# Patient Record
Sex: MALE | Race: ASIAN | NOT HISPANIC OR LATINO
[De-identification: names, ages, dates, MRNs, and addresses within clinical notes are randomized per-mention and may not be internally consistent; named-entity substitution may affect disease eponyms.]

---

## 2017-01-20 ENCOUNTER — APPOINTMENT (OUTPATIENT)
Dept: INTERNAL MEDICINE | Facility: CLINIC | Age: 27
End: 2017-01-20

## 2017-04-13 ENCOUNTER — APPOINTMENT (OUTPATIENT)
Dept: INTERNAL MEDICINE | Facility: CLINIC | Age: 27
End: 2017-04-13

## 2017-04-13 VITALS
WEIGHT: 192 LBS | HEIGHT: 70 IN | SYSTOLIC BLOOD PRESSURE: 104 MMHG | BODY MASS INDEX: 27.49 KG/M2 | HEART RATE: 68 BPM | DIASTOLIC BLOOD PRESSURE: 64 MMHG | OXYGEN SATURATION: 99 %

## 2017-04-13 DIAGNOSIS — Z23 ENCOUNTER FOR IMMUNIZATION: ICD-10-CM

## 2017-04-18 LAB
ALBUMIN SERPL ELPH-MCNC: 4.1 G/DL
ALP BLD-CCNC: 65 U/L
ALT SERPL-CCNC: 48 U/L
ANION GAP SERPL CALC-SCNC: 13 MMOL/L
AST SERPL-CCNC: 33 U/L
BASOPHILS # BLD AUTO: 0.02 K/UL
BASOPHILS NFR BLD AUTO: 0.3 %
BILIRUB SERPL-MCNC: 0.7 MG/DL
BUN SERPL-MCNC: 16 MG/DL
CALCIUM SERPL-MCNC: 9.6 MG/DL
CHLORIDE SERPL-SCNC: 103 MMOL/L
CHOLEST SERPL-MCNC: 154 MG/DL
CHOLEST/HDLC SERPL: 2.5 RATIO
CO2 SERPL-SCNC: 23 MMOL/L
CREAT SERPL-MCNC: 1 MG/DL
EOSINOPHIL # BLD AUTO: 0.11 K/UL
EOSINOPHIL NFR BLD AUTO: 1.9 %
FOLATE SERPL-MCNC: 8.5 NG/ML
GLUCOSE SERPL-MCNC: 99 MG/DL
HBA1C MFR BLD HPLC: 5.5 %
HBV CORE IGG+IGM SER QL: NONREACTIVE
HBV SURFACE AB SER QL: REACTIVE
HBV SURFACE AG SER QL: NONREACTIVE
HCT VFR BLD CALC: 48.5 %
HCV AB SER QL: NONREACTIVE
HCV S/CO RATIO: 0.12 S/CO
HDLC SERPL-MCNC: 62 MG/DL
HGB BLD-MCNC: 16 G/DL
HIV1+2 AB SPEC QL IA.RAPID: NONREACTIVE
IMM GRANULOCYTES NFR BLD AUTO: 0.2 %
IRON SATN MFR SERPL: 33 %
IRON SERPL-MCNC: 125 UG/DL
LDLC SERPL CALC-MCNC: 78 MG/DL
LYMPHOCYTES # BLD AUTO: 1.86 K/UL
LYMPHOCYTES NFR BLD AUTO: 32.1 %
MAN DIFF?: NORMAL
MCHC RBC-ENTMCNC: 26.2 PG
MCHC RBC-ENTMCNC: 33 GM/DL
MCV RBC AUTO: 79.4 FL
MONOCYTES # BLD AUTO: 0.42 K/UL
MONOCYTES NFR BLD AUTO: 7.3 %
NEUTROPHILS # BLD AUTO: 3.37 K/UL
NEUTROPHILS NFR BLD AUTO: 58.2 %
PLATELET # BLD AUTO: 223 K/UL
POTASSIUM SERPL-SCNC: 4.5 MMOL/L
PROT SERPL-MCNC: 7.9 G/DL
RBC # BLD: 6.11 M/UL
RBC # FLD: 14 %
SODIUM SERPL-SCNC: 139 MMOL/L
TIBC SERPL-MCNC: 382 UG/DL
TRIGL SERPL-MCNC: 69 MG/DL
TSH SERPL-ACNC: 1.1 UIU/ML
UIBC SERPL-MCNC: 257 UG/DL
VIT B12 SERPL-MCNC: 336 PG/ML
WBC # FLD AUTO: 5.79 K/UL

## 2017-04-19 LAB — HEV AB SER QL: NEGATIVE

## 2017-04-20 ENCOUNTER — APPOINTMENT (OUTPATIENT)
Dept: SURGERY | Facility: CLINIC | Age: 27
End: 2017-04-20

## 2017-04-20 VITALS
DIASTOLIC BLOOD PRESSURE: 74 MMHG | SYSTOLIC BLOOD PRESSURE: 118 MMHG | HEART RATE: 54 BPM | RESPIRATION RATE: 15 BRPM | OXYGEN SATURATION: 98 % | HEIGHT: 72 IN | TEMPERATURE: 98.3 F | BODY MASS INDEX: 26.01 KG/M2 | WEIGHT: 192 LBS

## 2017-04-28 ENCOUNTER — OTHER (OUTPATIENT)
Age: 27
End: 2017-04-28

## 2017-04-28 ENCOUNTER — APPOINTMENT (OUTPATIENT)
Dept: SURGERY | Facility: CLINIC | Age: 27
End: 2017-04-28

## 2017-04-28 VITALS
DIASTOLIC BLOOD PRESSURE: 81 MMHG | OXYGEN SATURATION: 97 % | SYSTOLIC BLOOD PRESSURE: 115 MMHG | TEMPERATURE: 98 F | HEART RATE: 78 BPM | RESPIRATION RATE: 15 BRPM

## 2017-05-03 ENCOUNTER — OTHER (OUTPATIENT)
Age: 27
End: 2017-05-03

## 2017-05-15 ENCOUNTER — APPOINTMENT (OUTPATIENT)
Dept: SURGERY | Facility: CLINIC | Age: 27
End: 2017-05-15

## 2017-05-16 ENCOUNTER — OUTPATIENT (OUTPATIENT)
Dept: OUTPATIENT SERVICES | Facility: HOSPITAL | Age: 27
LOS: 1 days | End: 2017-05-16
Payer: COMMERCIAL

## 2017-05-16 VITALS
DIASTOLIC BLOOD PRESSURE: 82 MMHG | HEIGHT: 72 IN | RESPIRATION RATE: 18 BRPM | OXYGEN SATURATION: 98 % | WEIGHT: 183.2 LBS | SYSTOLIC BLOOD PRESSURE: 121 MMHG | TEMPERATURE: 98 F

## 2017-05-16 DIAGNOSIS — Z90.49 ACQUIRED ABSENCE OF OTHER SPECIFIED PARTS OF DIGESTIVE TRACT: Chronic | ICD-10-CM

## 2017-05-16 DIAGNOSIS — Z01.818 ENCOUNTER FOR OTHER PREPROCEDURAL EXAMINATION: ICD-10-CM

## 2017-05-16 DIAGNOSIS — R10.9 UNSPECIFIED ABDOMINAL PAIN: ICD-10-CM

## 2017-05-16 DIAGNOSIS — R10.31 RIGHT LOWER QUADRANT PAIN: ICD-10-CM

## 2017-05-16 LAB
HCT VFR BLD CALC: 45.4 % — SIGNIFICANT CHANGE UP (ref 39–50)
HGB BLD-MCNC: 15.7 G/DL — SIGNIFICANT CHANGE UP (ref 13–17)
MCHC RBC-ENTMCNC: 27.1 PG — SIGNIFICANT CHANGE UP (ref 27–34)
MCHC RBC-ENTMCNC: 34.6 GM/DL — SIGNIFICANT CHANGE UP (ref 32–36)
MCV RBC AUTO: 78.3 FL — LOW (ref 80–100)
PLATELET # BLD AUTO: 225 K/UL — SIGNIFICANT CHANGE UP (ref 150–400)
RBC # BLD: 5.8 M/UL — SIGNIFICANT CHANGE UP (ref 4.2–5.8)
RBC # FLD: 13.5 % — SIGNIFICANT CHANGE UP (ref 10.3–14.5)
WBC # BLD: 6.77 K/UL — SIGNIFICANT CHANGE UP (ref 3.8–10.5)
WBC # FLD AUTO: 6.77 K/UL — SIGNIFICANT CHANGE UP (ref 3.8–10.5)

## 2017-05-16 PROCEDURE — G0463: CPT

## 2017-05-16 PROCEDURE — 85027 COMPLETE CBC AUTOMATED: CPT

## 2017-05-16 RX ORDER — SODIUM CHLORIDE 9 MG/ML
3 INJECTION INTRAMUSCULAR; INTRAVENOUS; SUBCUTANEOUS EVERY 8 HOURS
Qty: 0 | Refills: 0 | Status: DISCONTINUED | OUTPATIENT
Start: 2017-05-24 | End: 2017-05-24

## 2017-05-16 RX ORDER — CEFAZOLIN SODIUM 1 G
2000 VIAL (EA) INJECTION ONCE
Qty: 0 | Refills: 0 | Status: COMPLETED | OUTPATIENT
Start: 2017-05-24 | End: 2017-05-24

## 2017-05-16 NOTE — H&P PST ADULT - PMH
Anal fissure    Chest pain  cardiac work up diagnosed GERD 2013  GERD (gastroesophageal reflux disease)  resolved, on no med  Pneumonia  2016

## 2017-05-16 NOTE — H&P PST ADULT - HISTORY OF PRESENT ILLNESS
This is a 27 y/o male c/o RLQ abdominal pain This is a 27 y/o male c/o RLQ abdominal pain s/p appendectomy in [place] 2004, he presents today for surgery

## 2017-05-16 NOTE — H&P PST ADULT - NSANTHOSAYNRD_GEN_A_CORE
No. YO screening performed.  STOP BANG Legend: 0-2 = LOW Risk; 3-4 = INTERMEDIATE Risk; 5-8 = HIGH Risk

## 2017-05-24 ENCOUNTER — OUTPATIENT (OUTPATIENT)
Dept: INPATIENT UNIT | Facility: HOSPITAL | Age: 27
LOS: 1 days | Discharge: HOME | End: 2017-05-24
Payer: COMMERCIAL

## 2017-05-24 ENCOUNTER — APPOINTMENT (OUTPATIENT)
Dept: SURGERY | Facility: HOSPITAL | Age: 27
End: 2017-05-24

## 2017-05-24 ENCOUNTER — TRANSCRIPTION ENCOUNTER (OUTPATIENT)
Age: 27
End: 2017-05-24

## 2017-05-24 VITALS
SYSTOLIC BLOOD PRESSURE: 125 MMHG | DIASTOLIC BLOOD PRESSURE: 73 MMHG | HEIGHT: 72 IN | OXYGEN SATURATION: 100 % | HEART RATE: 75 BPM | TEMPERATURE: 98 F | WEIGHT: 183.2 LBS | RESPIRATION RATE: 18 BRPM

## 2017-05-24 DIAGNOSIS — R10.31 RIGHT LOWER QUADRANT PAIN: ICD-10-CM

## 2017-05-24 DIAGNOSIS — Z90.49 ACQUIRED ABSENCE OF OTHER SPECIFIED PARTS OF DIGESTIVE TRACT: Chronic | ICD-10-CM

## 2017-05-24 LAB
ANION GAP SERPL CALC-SCNC: 12 MMOL/L — SIGNIFICANT CHANGE UP (ref 5–17)
BUN SERPL-MCNC: 15 MG/DL — SIGNIFICANT CHANGE UP (ref 7–23)
CALCIUM SERPL-MCNC: 8.2 MG/DL — LOW (ref 8.4–10.5)
CHLORIDE SERPL-SCNC: 104 MMOL/L — SIGNIFICANT CHANGE UP (ref 96–108)
CO2 SERPL-SCNC: 25 MMOL/L — SIGNIFICANT CHANGE UP (ref 22–31)
CREAT SERPL-MCNC: 1.07 MG/DL — SIGNIFICANT CHANGE UP (ref 0.5–1.3)
GLUCOSE SERPL-MCNC: 99 MG/DL — SIGNIFICANT CHANGE UP (ref 70–99)
HCT VFR BLD CALC: 41.8 % — SIGNIFICANT CHANGE UP (ref 39–50)
HGB BLD-MCNC: 14.3 G/DL — SIGNIFICANT CHANGE UP (ref 13–17)
MAGNESIUM SERPL-MCNC: 2 MG/DL — SIGNIFICANT CHANGE UP (ref 1.6–2.6)
MCHC RBC-ENTMCNC: 28.3 PG — SIGNIFICANT CHANGE UP (ref 27–34)
MCHC RBC-ENTMCNC: 34.3 GM/DL — SIGNIFICANT CHANGE UP (ref 32–36)
MCV RBC AUTO: 82.6 FL — SIGNIFICANT CHANGE UP (ref 80–100)
PHOSPHATE SERPL-MCNC: 3.2 MG/DL — SIGNIFICANT CHANGE UP (ref 2.5–4.5)
PLATELET # BLD AUTO: 188 K/UL — SIGNIFICANT CHANGE UP (ref 150–400)
POTASSIUM SERPL-MCNC: 4.2 MMOL/L — SIGNIFICANT CHANGE UP (ref 3.5–5.3)
POTASSIUM SERPL-SCNC: 4.2 MMOL/L — SIGNIFICANT CHANGE UP (ref 3.5–5.3)
RBC # BLD: 5.06 M/UL — SIGNIFICANT CHANGE UP (ref 4.2–5.8)
RBC # FLD: 12.2 % — SIGNIFICANT CHANGE UP (ref 10.3–14.5)
SODIUM SERPL-SCNC: 141 MMOL/L — SIGNIFICANT CHANGE UP (ref 135–145)
WBC # BLD: 10.3 K/UL — SIGNIFICANT CHANGE UP (ref 3.8–10.5)
WBC # FLD AUTO: 10.3 K/UL — SIGNIFICANT CHANGE UP (ref 3.8–10.5)

## 2017-05-24 RX ORDER — SODIUM CHLORIDE 9 MG/ML
1000 INJECTION, SOLUTION INTRAVENOUS
Qty: 0 | Refills: 0 | Status: DISCONTINUED | OUTPATIENT
Start: 2017-05-24 | End: 2017-05-24

## 2017-05-24 RX ORDER — ACETAMINOPHEN 500 MG
1000 TABLET ORAL ONCE
Qty: 0 | Refills: 0 | Status: COMPLETED | OUTPATIENT
Start: 2017-05-24 | End: 2017-05-24

## 2017-05-24 RX ORDER — LIDOCAINE 4 G/100G
1 CREAM TOPICAL EVERY 8 HOURS
Qty: 0 | Refills: 0 | Status: DISCONTINUED | OUTPATIENT
Start: 2017-05-24 | End: 2017-05-25

## 2017-05-24 RX ORDER — ONDANSETRON 8 MG/1
4 TABLET, FILM COATED ORAL EVERY 8 HOURS
Qty: 0 | Refills: 0 | Status: DISCONTINUED | OUTPATIENT
Start: 2017-05-24 | End: 2017-05-24

## 2017-05-24 RX ORDER — HEPARIN SODIUM 5000 [USP'U]/ML
5000 INJECTION INTRAVENOUS; SUBCUTANEOUS EVERY 8 HOURS
Qty: 0 | Refills: 0 | Status: DISCONTINUED | OUTPATIENT
Start: 2017-05-24 | End: 2017-05-25

## 2017-05-24 RX ORDER — HYDROMORPHONE HYDROCHLORIDE 2 MG/ML
0.5 INJECTION INTRAMUSCULAR; INTRAVENOUS; SUBCUTANEOUS
Qty: 0 | Refills: 0 | Status: DISCONTINUED | OUTPATIENT
Start: 2017-05-24 | End: 2017-05-24

## 2017-05-24 RX ORDER — OXYCODONE HYDROCHLORIDE 5 MG/1
1 TABLET ORAL
Qty: 18 | Refills: 0 | OUTPATIENT
Start: 2017-05-24 | End: 2017-05-27

## 2017-05-24 RX ADMIN — HYDROMORPHONE HYDROCHLORIDE 0.5 MILLIGRAM(S): 2 INJECTION INTRAMUSCULAR; INTRAVENOUS; SUBCUTANEOUS at 12:30

## 2017-05-24 RX ADMIN — Medication 1000 MILLIGRAM(S): at 14:56

## 2017-05-24 RX ADMIN — HEPARIN SODIUM 5000 UNIT(S): 5000 INJECTION INTRAVENOUS; SUBCUTANEOUS at 21:14

## 2017-05-24 RX ADMIN — ONDANSETRON 4 MILLIGRAM(S): 8 TABLET, FILM COATED ORAL at 13:56

## 2017-05-24 RX ADMIN — LIDOCAINE 1 APPLICATION(S): 4 CREAM TOPICAL at 23:14

## 2017-05-24 RX ADMIN — SODIUM CHLORIDE 3 MILLILITER(S): 9 INJECTION INTRAMUSCULAR; INTRAVENOUS; SUBCUTANEOUS at 06:28

## 2017-05-24 RX ADMIN — Medication 400 MILLIGRAM(S): at 14:27

## 2017-05-24 NOTE — ASU DISCHARGE PLAN (ADULT/PEDIATRIC). - NOTIFY
Persistent Nausea and Vomiting/Bleeding that does not stop/Pain not relieved by Medications/Unable to Urinate/Fever greater than 101

## 2017-05-24 NOTE — ASU DISCHARGE PLAN (ADULT/PEDIATRIC). - MEDICATION SUMMARY - MEDICATIONS TO TAKE
I will START or STAY ON the medications listed below when I get home from the hospital:    acetaminophen-oxycodone 325 mg-5 mg oral tablet  -- 1 tab(s) by mouth every 4 hours, As needed, Moderate Pain (4 - 6) -for severe pain MDD:6 tablets  -- Indication: For Severe pain

## 2017-05-24 NOTE — ASU DISCHARGE PLAN (ADULT/PEDIATRIC). - BATHING
You may begin showering in 24 hours. Do not scrub over incision. Keep dressing on for 48 hours./shower only

## 2017-05-24 NOTE — ASU DISCHARGE PLAN (ADULT/PEDIATRIC). - SPECIAL INSTRUCTIONS
You may shower starting in 24 hours.  Leave your dressing on, remove the outer dressing in 48 hours. You have steri strips underneath that will fall off by themselves.  You may resume a regular diet.  You have a prescription for Percocet (sent to your pharmacy, [facility]). Take motrin as needed for pain as instructed on the bottle. For severe pain take your prescription Percocet. Do not drive while taking Percocet.

## 2017-05-25 ENCOUNTER — TRANSCRIPTION ENCOUNTER (OUTPATIENT)
Age: 27
End: 2017-05-25

## 2017-05-25 VITALS
OXYGEN SATURATION: 97 % | SYSTOLIC BLOOD PRESSURE: 117 MMHG | RESPIRATION RATE: 18 BRPM | DIASTOLIC BLOOD PRESSURE: 74 MMHG | HEART RATE: 75 BPM | TEMPERATURE: 98 F

## 2017-05-25 LAB
ANION GAP SERPL CALC-SCNC: 14 MMOL/L — SIGNIFICANT CHANGE UP (ref 5–17)
APPEARANCE UR: CLEAR — SIGNIFICANT CHANGE UP
BACTERIA # UR AUTO: ABNORMAL /HPF
BILIRUB UR-MCNC: NEGATIVE — SIGNIFICANT CHANGE UP
BUN SERPL-MCNC: 8 MG/DL — SIGNIFICANT CHANGE UP (ref 7–23)
CALCIUM SERPL-MCNC: 8.3 MG/DL — LOW (ref 8.4–10.5)
CHLORIDE SERPL-SCNC: 105 MMOL/L — SIGNIFICANT CHANGE UP (ref 96–108)
CO2 SERPL-SCNC: 22 MMOL/L — SIGNIFICANT CHANGE UP (ref 22–31)
COLOR SPEC: SIGNIFICANT CHANGE UP
COMMENT - URINE: SIGNIFICANT CHANGE UP
CREAT SERPL-MCNC: 1.14 MG/DL — SIGNIFICANT CHANGE UP (ref 0.5–1.3)
DIFF PNL FLD: ABNORMAL
GLUCOSE SERPL-MCNC: 84 MG/DL — SIGNIFICANT CHANGE UP (ref 70–99)
GLUCOSE UR QL: NEGATIVE — SIGNIFICANT CHANGE UP
HCT VFR BLD CALC: 42.6 % — SIGNIFICANT CHANGE UP (ref 39–50)
HGB BLD-MCNC: 14.3 G/DL — SIGNIFICANT CHANGE UP (ref 13–17)
HYALINE CASTS # UR AUTO: ABNORMAL
KETONES UR-MCNC: NEGATIVE — SIGNIFICANT CHANGE UP
LEUKOCYTE ESTERASE UR-ACNC: ABNORMAL
MCHC RBC-ENTMCNC: 26.6 PG — LOW (ref 27–34)
MCHC RBC-ENTMCNC: 33.6 GM/DL — SIGNIFICANT CHANGE UP (ref 32–36)
MCV RBC AUTO: 79.3 FL — LOW (ref 80–100)
NITRITE UR-MCNC: NEGATIVE — SIGNIFICANT CHANGE UP
PH UR: 7.5 — SIGNIFICANT CHANGE UP (ref 5–8)
PLATELET # BLD AUTO: 191 K/UL — SIGNIFICANT CHANGE UP (ref 150–400)
POTASSIUM SERPL-MCNC: 4.1 MMOL/L — SIGNIFICANT CHANGE UP (ref 3.5–5.3)
POTASSIUM SERPL-SCNC: 4.1 MMOL/L — SIGNIFICANT CHANGE UP (ref 3.5–5.3)
PROT UR-MCNC: NEGATIVE — SIGNIFICANT CHANGE UP
RBC # BLD: 5.37 M/UL — SIGNIFICANT CHANGE UP (ref 4.2–5.8)
RBC # FLD: 14 % — SIGNIFICANT CHANGE UP (ref 10.3–14.5)
RBC CASTS # UR COMP ASSIST: ABNORMAL /HPF (ref 0–2)
SODIUM SERPL-SCNC: 141 MMOL/L — SIGNIFICANT CHANGE UP (ref 135–145)
SP GR SPEC: 1.01 — LOW (ref 1.01–1.02)
UROBILINOGEN FLD QL: NEGATIVE — SIGNIFICANT CHANGE UP
WBC # BLD: 9.01 K/UL — SIGNIFICANT CHANGE UP (ref 3.8–10.5)
WBC # FLD AUTO: 9.01 K/UL — SIGNIFICANT CHANGE UP (ref 3.8–10.5)
WBC UR QL: ABNORMAL /HPF (ref 0–5)

## 2017-05-25 PROCEDURE — 49320 DIAG LAPARO SEPARATE PROC: CPT

## 2017-05-25 PROCEDURE — 81001 URINALYSIS AUTO W/SCOPE: CPT

## 2017-05-25 PROCEDURE — 85027 COMPLETE CBC AUTOMATED: CPT

## 2017-05-25 PROCEDURE — 83735 ASSAY OF MAGNESIUM: CPT

## 2017-05-25 PROCEDURE — 80048 BASIC METABOLIC PNL TOTAL CA: CPT

## 2017-05-25 PROCEDURE — 84100 ASSAY OF PHOSPHORUS: CPT

## 2017-05-25 RX ORDER — ONDANSETRON 8 MG/1
4 TABLET, FILM COATED ORAL ONCE
Qty: 0 | Refills: 0 | Status: COMPLETED | OUTPATIENT
Start: 2017-05-25 | End: 2017-05-25

## 2017-05-25 RX ORDER — ACETAMINOPHEN 500 MG
650 TABLET ORAL EVERY 6 HOURS
Qty: 0 | Refills: 0 | Status: DISCONTINUED | OUTPATIENT
Start: 2017-05-25 | End: 2017-05-25

## 2017-05-25 RX ORDER — IBUPROFEN 200 MG
400 TABLET ORAL EVERY 6 HOURS
Qty: 0 | Refills: 0 | Status: DISCONTINUED | OUTPATIENT
Start: 2017-05-25 | End: 2017-05-25

## 2017-05-25 RX ADMIN — ONDANSETRON 4 MILLIGRAM(S): 8 TABLET, FILM COATED ORAL at 00:06

## 2017-05-25 RX ADMIN — ONDANSETRON 4 MILLIGRAM(S): 8 TABLET, FILM COATED ORAL at 06:26

## 2017-05-25 RX ADMIN — Medication 650 MILLIGRAM(S): at 15:19

## 2017-05-25 RX ADMIN — HEPARIN SODIUM 5000 UNIT(S): 5000 INJECTION INTRAVENOUS; SUBCUTANEOUS at 05:10

## 2017-05-25 RX ADMIN — Medication 650 MILLIGRAM(S): at 14:49

## 2017-05-25 RX ADMIN — LIDOCAINE 1 APPLICATION(S): 4 CREAM TOPICAL at 05:10

## 2017-05-25 NOTE — DISCHARGE NOTE ADULT - PLAN OF CARE
s/p negative diagnostic laparoscopy. Pain control and outpatient follow up. Please do not drive until your pain is well controlled, and you do not need to take pain medications. These medications may make you constipated. If so, please take over the counter stool softeners for symptoms.   You may take over the counter pain medication like tylenol, but do not take tylenol with percocet as you may exceed maximum dosage.   Please follow up with Dr. Drake in 1-2 weeks. Contact info below.

## 2017-05-25 NOTE — DISCHARGE NOTE ADULT - MEDICATION SUMMARY - MEDICATIONS TO TAKE
I will START or STAY ON the medications listed below when I get home from the hospital:    acetaminophen-oxycodone 325 mg-5 mg oral tablet  -- 1 tab(s) by mouth every 4 hours, As needed, Moderate Pain (4 - 6) -for severe pain MDD:6 tablets  -- Indication: For Pain

## 2017-05-25 NOTE — PROVIDER CONTACT NOTE (OTHER) - ASSESSMENT
pt w/ c/o dizziness and nausea, percocet administered @  [facility] pt w/ c/o dizziness and nausea, r/t percocet administered @ 2115?  [facility]

## 2017-05-25 NOTE — DISCHARGE NOTE ADULT - HOSPITAL COURSE
This is a 27 y/o male c/o RLQ abdominal pain s/p appendectomy in [place] 2004, he presents today for surgery. Patient underwent a diagnostic laparoscopy, which was negative for any disease or concern.. The patient tolerated the procedure well. There were no complications. The patient was extubated in the OR.  The patient was transferred to the PACU in stable condition.     Patient was nauseated and dizzy post-operatively secondary to anesthesia. He went into urinary Patient with urinary retention likely secondary to current surgical procedure. Allison was placed. Patient was admitted overnight. He is not symptomatic POD 1. Patient was given trial of void in AM. Pain was controlled. DVT ppx were provided throughout hospitalization.     At the time of discharge, the patient was hemodynamically stable, was tolerating PO diet, was voiding urine, was ambulating, and was comfortable with adequate pain control. The patient was instructed to follow up with Dr. Drake within 1-2 weeks after discharge from the hospital. The patient felt comfortable with discharge. The patient was discharged to home. The patient had no other issues.

## 2017-05-25 NOTE — DISCHARGE NOTE ADULT - INSTRUCTIONS
You may eat a regular diet and shower, but please do not soak your wounds in a bath or pool. You may shower. Pat Dry abdomen. Leave the white steri strips in place, they will fall off on their own in approximately 5-7 days.   Activity as tolerated  NOTIFY YOUR SURGEON IF: You have any bleeding that does not stop, any pus draining from your wound, any fever (over 100.4 F) or chills, persistent nausea/vomiting, persistent diarrhea, or if your pain is not controlled on your discharge pain medications.

## 2017-05-25 NOTE — DISCHARGE NOTE ADULT - PATIENT PORTAL LINK FT
“You can access the Follow[facility] Patient Portal, offered by [facility], by registering with the following website: [email or web address]”

## 2017-05-25 NOTE — DISCHARGE NOTE ADULT - ADDITIONAL INSTRUCTIONS
1. Dr. Drake  2. Please call for a follow up appointment with your primary care medical doctor upon discharge regarding your recent surgery and hospitalization.

## 2017-05-25 NOTE — DISCHARGE NOTE ADULT - CARE PLAN
Principal Discharge DX:	Generalized abdominal pain  Goal:	s/p negative diagnostic laparoscopy. Pain control and outpatient follow up.  Instructions for follow-up, activity and diet:	Please do not drive until your pain is well controlled, and you do not need to take pain medications. These medications may make you constipated. If so, please take over the counter stool softeners for symptoms.   You may take over the counter pain medication like tylenol, but do not take tylenol with percocet as you may exceed maximum dosage.   Please follow up with Dr. Drake in 1-2 weeks. Contact info below.

## 2017-05-25 NOTE — DISCHARGE NOTE ADULT - CARE PROVIDER_API CALL
Cameron Drake (MD), Surgery  [address] [place] [address]  Phone: [phone]  Fax: [phone]

## 2017-06-15 ENCOUNTER — APPOINTMENT (OUTPATIENT)
Dept: SURGERY | Facility: CLINIC | Age: 27
End: 2017-06-15

## 2017-06-15 VITALS
OXYGEN SATURATION: 97 % | HEART RATE: 60 BPM | DIASTOLIC BLOOD PRESSURE: 76 MMHG | RESPIRATION RATE: 16 BRPM | SYSTOLIC BLOOD PRESSURE: 117 MMHG | TEMPERATURE: 97.9 F

## 2017-06-15 RX ORDER — SODIUM PICOSULFATE, MAGNESIUM OXIDE, AND ANHYDROUS CITRIC ACID 10; 3.5; 12 MG/16.2G; G/16.2G; G/16.2G
10-3.5-12 POWDER, METERED ORAL
Qty: 1 | Refills: 0 | Status: DISCONTINUED | COMMUNITY
Start: 2017-04-28 | End: 2017-06-15

## 2017-07-10 ENCOUNTER — EMERGENCY (EMERGENCY)
Facility: HOSPITAL | Age: 27
LOS: 1 days | Discharge: HOME | End: 2017-07-10
Attending: EMERGENCY MEDICINE | Admitting: EMERGENCY MEDICINE
Payer: COMMERCIAL

## 2017-07-10 VITALS
SYSTOLIC BLOOD PRESSURE: 116 MMHG | OXYGEN SATURATION: 100 % | TEMPERATURE: 99 F | RESPIRATION RATE: 20 BRPM | DIASTOLIC BLOOD PRESSURE: 73 MMHG | HEART RATE: 81 BPM

## 2017-07-10 DIAGNOSIS — Z90.49 ACQUIRED ABSENCE OF OTHER SPECIFIED PARTS OF DIGESTIVE TRACT: Chronic | ICD-10-CM

## 2017-07-10 PROCEDURE — 99283 EMERGENCY DEPT VISIT LOW MDM: CPT

## 2017-07-10 RX ORDER — ACETAMINOPHEN 500 MG
975 TABLET ORAL ONCE
Qty: 0 | Refills: 0 | Status: COMPLETED | OUTPATIENT
Start: 2017-07-10 | End: 2017-07-10

## 2017-07-10 RX ADMIN — Medication 975 MILLIGRAM(S): at 23:42

## 2017-07-10 NOTE — ED ADULT TRIAGE NOTE - CHIEF COMPLAINT QUOTE
Pt hit his head on a metal door.  Door was eliud.  Visible laceration to head.  Laceration is not bleeding right now.  No LOC.

## 2017-07-11 NOTE — ED PROVIDER NOTE - MEDICAL DECISION MAKING DETAILS
Pt sustained accidental head injury hours ago no LOC no n/v/sz non focal neuro exam no hematoma tylenol for pain detailed head injury if/u instructions to pt and spouse out pt f/u with strict return to ED advice -- Zheng

## 2017-07-11 NOTE — ED PROVIDER NOTE - CRANIAL NERVE AND PUPILLARY EXAM
cranial nerves 2-12 intact/tongue is midline/central and peripheral vision intact/corneal reflex intact/extra-ocular movements intact/cough reflex intact

## 2017-07-11 NOTE — ED PROVIDER NOTE - ATTENDING CONTRIBUTION TO CARE
I have seen and evaluated this patient with the advance practice clinician.   I agree with the findings  unless other wise stated. I have amended notes where needed.  After my face to face bedside evaluation, I am noting: Pt sustained accidental head injury hours ago no LOC no n/v/sz non focal neuro exam no hematoma tylenol for pain detailed head injury if/u instructions to pt and spouse out pt f/u with strict return to ED advice -- Zheng
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
## 2017-07-11 NOTE — ED PROVIDER NOTE - PHYSICAL EXAMINATION
Gen: AAO x 3, NAD  Skin: No rashes or lesions  HEENT: small abrasion right frontal scalp, PERRLA, EOMI, MMM  Resp: unlabored CTAB  Cardiac: rrr s1s2, no murmurs, rubs or gallops  GI: ND, +BS, Soft, NT  Ext: no pedal edema, FROM in all extremities  Neuro: no focal deficits

## 2017-08-23 ENCOUNTER — APPOINTMENT (OUTPATIENT)
Dept: PEDIATRIC ALLERGY IMMUNOLOGY | Facility: CLINIC | Age: 27
End: 2017-08-23

## 2017-09-13 RX ORDER — FLUTICASONE PROPIONATE 50 UG/1
50 SPRAY, METERED NASAL TWICE DAILY
Qty: 1 | Refills: 0 | Status: COMPLETED | COMMUNITY
Start: 2017-09-13 | End: 2017-10-13

## 2017-09-18 LAB
ALBUMIN SERPL ELPH-MCNC: 4.1 G/DL
ALP BLD-CCNC: 55 U/L
ALT SERPL-CCNC: 27 U/L
ANION GAP SERPL CALC-SCNC: 11 MMOL/L
APPEARANCE: CLEAR
AST SERPL-CCNC: 22 U/L
BACTERIA UR CULT: NORMAL
BACTERIA: NEGATIVE
BASOPHILS # BLD AUTO: 0.03 K/UL
BASOPHILS NFR BLD AUTO: 0.4 %
BILIRUB SERPL-MCNC: 0.6 MG/DL
BILIRUBIN URINE: NEGATIVE
BLOOD URINE: NEGATIVE
BUN SERPL-MCNC: 15 MG/DL
CALCIUM SERPL-MCNC: 9.5 MG/DL
CHLORIDE SERPL-SCNC: 105 MMOL/L
CHOLEST SERPL-MCNC: 134 MG/DL
CHOLEST/HDLC SERPL: 2.5 RATIO
CO2 SERPL-SCNC: 26 MMOL/L
COLOR: YELLOW
CREAT SERPL-MCNC: 1.08 MG/DL
EOSINOPHIL # BLD AUTO: 0.11 K/UL
EOSINOPHIL NFR BLD AUTO: 1.5 %
GLUCOSE QUALITATIVE U: NORMAL MG/DL
GLUCOSE SERPL-MCNC: 84 MG/DL
HBA1C MFR BLD HPLC: 5.2 %
HCT VFR BLD CALC: 45.8 %
HDLC SERPL-MCNC: 53 MG/DL
HGB BLD-MCNC: 15 G/DL
IMM GRANULOCYTES NFR BLD AUTO: 0.1 %
KETONES URINE: NEGATIVE
LDLC SERPL CALC-MCNC: 68 MG/DL
LEUKOCYTE ESTERASE URINE: NEGATIVE
LYMPHOCYTES # BLD AUTO: 2.27 K/UL
LYMPHOCYTES NFR BLD AUTO: 31.1 %
MAN DIFF?: NORMAL
MCHC RBC-ENTMCNC: 26.8 PG
MCHC RBC-ENTMCNC: 32.8 GM/DL
MCV RBC AUTO: 81.8 FL
MICROSCOPIC-UA: NORMAL
MONOCYTES # BLD AUTO: 0.57 K/UL
MONOCYTES NFR BLD AUTO: 7.8 %
NEUTROPHILS # BLD AUTO: 4.32 K/UL
NEUTROPHILS NFR BLD AUTO: 59.1 %
NITRITE URINE: NEGATIVE
PH URINE: 6.5
PLATELET # BLD AUTO: 224 K/UL
POTASSIUM SERPL-SCNC: 4.5 MMOL/L
PROT SERPL-MCNC: 7.5 G/DL
PROTEIN URINE: NEGATIVE MG/DL
RBC # BLD: 5.6 M/UL
RBC # FLD: 14.1 %
RED BLOOD CELLS URINE: 8 /HPF
SODIUM SERPL-SCNC: 142 MMOL/L
SPECIFIC GRAVITY URINE: 1.02
SQUAMOUS EPITHELIAL CELLS: 0 /HPF
TRIGL SERPL-MCNC: 65 MG/DL
TSH SERPL-ACNC: 0.48 UIU/ML
UROBILINOGEN URINE: NORMAL MG/DL
WBC # FLD AUTO: 7.31 K/UL
WHITE BLOOD CELLS URINE: 1 /HPF

## 2017-11-09 ENCOUNTER — MEDICATION RENEWAL (OUTPATIENT)
Age: 27
End: 2017-11-09

## 2017-11-09 RX ORDER — ONDANSETRON 4 MG/1
4 TABLET ORAL 3 TIMES DAILY
Qty: 1 | Refills: 0 | Status: COMPLETED | COMMUNITY
Start: 2017-11-09 | End: 2017-11-19

## 2017-12-01 ENCOUNTER — APPOINTMENT (OUTPATIENT)
Dept: GASTROENTEROLOGY | Facility: CLINIC | Age: 27
End: 2017-12-01
Payer: COMMERCIAL

## 2017-12-01 VITALS
HEART RATE: 79 BPM | OXYGEN SATURATION: 99 % | WEIGHT: 185 LBS | SYSTOLIC BLOOD PRESSURE: 118 MMHG | TEMPERATURE: 97.7 F | BODY MASS INDEX: 25.06 KG/M2 | HEIGHT: 72 IN | DIASTOLIC BLOOD PRESSURE: 64 MMHG | RESPIRATION RATE: 14 BRPM

## 2017-12-01 DIAGNOSIS — K58.9 IRRITABLE BOWEL SYNDROME W/OUT DIARRHEA: ICD-10-CM

## 2017-12-01 PROCEDURE — 99204 OFFICE O/P NEW MOD 45 MIN: CPT

## 2017-12-04 ENCOUNTER — MESSAGE (OUTPATIENT)
Age: 27
End: 2017-12-04

## 2017-12-04 LAB
ALBUMIN SERPL ELPH-MCNC: 4.1 G/DL
ALP BLD-CCNC: 53 U/L
ALT SERPL-CCNC: 50 U/L
ANION GAP SERPL CALC-SCNC: 13 MMOL/L
AST SERPL-CCNC: 27 U/L
BASOPHILS # BLD AUTO: 0.03 K/UL
BASOPHILS NFR BLD AUTO: 0.5 %
BILIRUB SERPL-MCNC: 0.6 MG/DL
BUN SERPL-MCNC: 12 MG/DL
CALCIUM SERPL-MCNC: 9.2 MG/DL
CHLORIDE SERPL-SCNC: 103 MMOL/L
CO2 SERPL-SCNC: 25 MMOL/L
CREAT SERPL-MCNC: 1.08 MG/DL
CRP SERPL-MCNC: <0.2 MG/DL
EOSINOPHIL # BLD AUTO: 0.06 K/UL
EOSINOPHIL NFR BLD AUTO: 0.9 %
GLUCOSE SERPL-MCNC: 97 MG/DL
HCT VFR BLD CALC: 46.4 %
HGB BLD-MCNC: 16 G/DL
IMM GRANULOCYTES NFR BLD AUTO: 0 %
LYMPHOCYTES # BLD AUTO: 1.96 K/UL
LYMPHOCYTES NFR BLD AUTO: 30 %
MAN DIFF?: NORMAL
MCHC RBC-ENTMCNC: 28 PG
MCHC RBC-ENTMCNC: 34.5 GM/DL
MCV RBC AUTO: 81.1 FL
MONOCYTES # BLD AUTO: 0.46 K/UL
MONOCYTES NFR BLD AUTO: 7 %
NEUTROPHILS # BLD AUTO: 4.03 K/UL
NEUTROPHILS NFR BLD AUTO: 61.6 %
PLATELET # BLD AUTO: 219 K/UL
POTASSIUM SERPL-SCNC: 4.4 MMOL/L
PROT SERPL-MCNC: 7.6 G/DL
RBC # BLD: 5.72 M/UL
RBC # FLD: 14 %
SODIUM SERPL-SCNC: 141 MMOL/L
TTG IGA SER IA-ACNC: 6.7 UNITS
TTG IGA SER-ACNC: NEGATIVE
TTG IGG SER IA-ACNC: <5 UNITS
TTG IGG SER IA-ACNC: NEGATIVE
WBC # FLD AUTO: 6.54 K/UL

## 2017-12-05 LAB
ENDOMYSIUM IGA SER QL: NEGATIVE
ENDOMYSIUM IGA TITR SER: NORMAL

## 2017-12-08 ENCOUNTER — APPOINTMENT (OUTPATIENT)
Dept: GASTROENTEROLOGY | Facility: CLINIC | Age: 27
End: 2017-12-08

## 2017-12-20 ENCOUNTER — APPOINTMENT (OUTPATIENT)
Dept: GASTROENTEROLOGY | Facility: AMBULATORY MEDICAL SERVICES | Age: 27
End: 2017-12-20

## 2018-01-03 ENCOUNTER — APPOINTMENT (OUTPATIENT)
Dept: INTERNAL MEDICINE | Facility: CLINIC | Age: 28
End: 2018-01-03
Payer: COMMERCIAL

## 2018-01-03 VITALS
OXYGEN SATURATION: 98 % | WEIGHT: 186 LBS | HEIGHT: 72 IN | SYSTOLIC BLOOD PRESSURE: 100 MMHG | HEART RATE: 68 BPM | BODY MASS INDEX: 25.19 KG/M2 | DIASTOLIC BLOOD PRESSURE: 64 MMHG

## 2018-01-03 PROCEDURE — 99213 OFFICE O/P EST LOW 20 MIN: CPT

## 2018-01-07 ENCOUNTER — FORM ENCOUNTER (OUTPATIENT)
Age: 28
End: 2018-01-07

## 2018-01-08 ENCOUNTER — APPOINTMENT (OUTPATIENT)
Dept: RADIOLOGY | Facility: IMAGING CENTER | Age: 28
End: 2018-01-08
Payer: COMMERCIAL

## 2018-01-08 ENCOUNTER — OUTPATIENT (OUTPATIENT)
Dept: OUTPATIENT SERVICES | Facility: HOSPITAL | Age: 28
LOS: 1 days | End: 2018-01-08
Payer: COMMERCIAL

## 2018-01-08 DIAGNOSIS — J20.9 ACUTE BRONCHITIS, UNSPECIFIED: ICD-10-CM

## 2018-01-08 DIAGNOSIS — Z90.49 ACQUIRED ABSENCE OF OTHER SPECIFIED PARTS OF DIGESTIVE TRACT: Chronic | ICD-10-CM

## 2018-01-08 PROCEDURE — 71046 X-RAY EXAM CHEST 2 VIEWS: CPT

## 2018-01-08 PROCEDURE — 71046 X-RAY EXAM CHEST 2 VIEWS: CPT | Mod: 26

## 2018-01-09 ENCOUNTER — EMERGENCY (EMERGENCY)
Facility: HOSPITAL | Age: 28
LOS: 1 days | Discharge: HOME | End: 2018-01-09
Attending: EMERGENCY MEDICINE | Admitting: EMERGENCY MEDICINE
Payer: COMMERCIAL

## 2018-01-09 ENCOUNTER — FORM ENCOUNTER (OUTPATIENT)
Age: 28
End: 2018-01-09

## 2018-01-09 ENCOUNTER — CHART COPY (OUTPATIENT)
Age: 28
End: 2018-01-09

## 2018-01-09 VITALS
HEART RATE: 70 BPM | DIASTOLIC BLOOD PRESSURE: 69 MMHG | OXYGEN SATURATION: 99 % | SYSTOLIC BLOOD PRESSURE: 153 MMHG | TEMPERATURE: 98 F | RESPIRATION RATE: 22 BRPM

## 2018-01-09 DIAGNOSIS — Z90.49 ACQUIRED ABSENCE OF OTHER SPECIFIED PARTS OF DIGESTIVE TRACT: Chronic | ICD-10-CM

## 2018-01-09 LAB
ALBUMIN SERPL ELPH-MCNC: 4.6 G/DL — SIGNIFICANT CHANGE UP (ref 3.3–5)
ALP SERPL-CCNC: 63 U/L — SIGNIFICANT CHANGE UP (ref 40–120)
ALT FLD-CCNC: 32 U/L RC — SIGNIFICANT CHANGE UP (ref 10–45)
ANION GAP SERPL CALC-SCNC: 19 MMOL/L — HIGH (ref 5–17)
AST SERPL-CCNC: 23 U/L — SIGNIFICANT CHANGE UP (ref 10–40)
BASE EXCESS BLDV CALC-SCNC: 3.6 MMOL/L — HIGH (ref -2–2)
BILIRUB SERPL-MCNC: 0.9 MG/DL — SIGNIFICANT CHANGE UP (ref 0.2–1.2)
BUN SERPL-MCNC: 9 MG/DL — SIGNIFICANT CHANGE UP (ref 7–23)
CA-I SERPL-SCNC: 1.11 MMOL/L — LOW (ref 1.12–1.3)
CALCIUM SERPL-MCNC: 9.8 MG/DL — SIGNIFICANT CHANGE UP (ref 8.4–10.5)
CHLORIDE BLDV-SCNC: 107 MMOL/L — SIGNIFICANT CHANGE UP (ref 96–108)
CHLORIDE SERPL-SCNC: 100 MMOL/L — SIGNIFICANT CHANGE UP (ref 96–108)
CO2 BLDV-SCNC: 24 MMOL/L — SIGNIFICANT CHANGE UP (ref 22–30)
CO2 SERPL-SCNC: 21 MMOL/L — LOW (ref 22–31)
CREAT SERPL-MCNC: 1.09 MG/DL — SIGNIFICANT CHANGE UP (ref 0.5–1.3)
GAS PNL BLDV: 137 MMOL/L — SIGNIFICANT CHANGE UP (ref 136–145)
GAS PNL BLDV: SIGNIFICANT CHANGE UP
GLUCOSE BLDV-MCNC: 118 MG/DL — HIGH (ref 70–99)
GLUCOSE SERPL-MCNC: 121 MG/DL — HIGH (ref 70–99)
HCO3 BLDV-SCNC: 24 MMOL/L — SIGNIFICANT CHANGE UP (ref 21–29)
HCT VFR BLD CALC: 46.1 % — SIGNIFICANT CHANGE UP (ref 39–50)
HCT VFR BLDA CALC: 51 % — HIGH (ref 39–50)
HGB BLD CALC-MCNC: 16.7 G/DL — SIGNIFICANT CHANGE UP (ref 13–17)
HGB BLD-MCNC: 16.7 G/DL — SIGNIFICANT CHANGE UP (ref 13–17)
LACTATE BLDV-MCNC: 3.5 MMOL/L — HIGH (ref 0.7–2)
LIDOCAIN IGE QN: 52 U/L — SIGNIFICANT CHANGE UP (ref 7–60)
MCHC RBC-ENTMCNC: 29.4 PG — SIGNIFICANT CHANGE UP (ref 27–34)
MCHC RBC-ENTMCNC: 36.1 GM/DL — HIGH (ref 32–36)
MCV RBC AUTO: 81.5 FL — SIGNIFICANT CHANGE UP (ref 80–100)
PCO2 BLDV: 25 MMHG — LOW (ref 35–50)
PH BLDV: 7.58 — HIGH (ref 7.35–7.45)
PLATELET # BLD AUTO: 208 K/UL — SIGNIFICANT CHANGE UP (ref 150–400)
PO2 BLDV: 15 MMHG — LOW (ref 25–45)
POTASSIUM BLDV-SCNC: 3.3 MMOL/L — LOW (ref 3.5–5)
POTASSIUM SERPL-MCNC: 3.7 MMOL/L — SIGNIFICANT CHANGE UP (ref 3.5–5.3)
POTASSIUM SERPL-SCNC: 3.7 MMOL/L — SIGNIFICANT CHANGE UP (ref 3.5–5.3)
PROT SERPL-MCNC: 8.4 G/DL — HIGH (ref 6–8.3)
RBC # BLD: 5.66 M/UL — SIGNIFICANT CHANGE UP (ref 4.2–5.8)
RBC # FLD: 12.2 % — SIGNIFICANT CHANGE UP (ref 10.3–14.5)
SAO2 % BLDV: 27 % — LOW (ref 67–88)
SODIUM SERPL-SCNC: 140 MMOL/L — SIGNIFICANT CHANGE UP (ref 135–145)
WBC # BLD: 8.3 K/UL — SIGNIFICANT CHANGE UP (ref 3.8–10.5)
WBC # FLD AUTO: 8.3 K/UL — SIGNIFICANT CHANGE UP (ref 3.8–10.5)

## 2018-01-09 PROCEDURE — 99285 EMERGENCY DEPT VISIT HI MDM: CPT

## 2018-01-09 RX ORDER — ONDANSETRON 8 MG/1
4 TABLET, FILM COATED ORAL ONCE
Qty: 0 | Refills: 0 | Status: COMPLETED | OUTPATIENT
Start: 2018-01-09 | End: 2018-01-09

## 2018-01-09 RX ORDER — HYDROMORPHONE HYDROCHLORIDE 2 MG/ML
1 INJECTION INTRAMUSCULAR; INTRAVENOUS; SUBCUTANEOUS ONCE
Qty: 0 | Refills: 0 | Status: DISCONTINUED | OUTPATIENT
Start: 2018-01-09 | End: 2018-01-09

## 2018-01-09 RX ORDER — MORPHINE SULFATE 50 MG/1
4 CAPSULE, EXTENDED RELEASE ORAL ONCE
Qty: 0 | Refills: 0 | Status: DISCONTINUED | OUTPATIENT
Start: 2018-01-09 | End: 2018-01-09

## 2018-01-09 RX ORDER — ALBUTEROL SULFATE 90 UG/1
108 (90 BASE) AEROSOL, METERED RESPIRATORY (INHALATION)
Qty: 1 | Refills: 0 | Status: COMPLETED | COMMUNITY
Start: 2018-01-09 | End: 2018-01-19

## 2018-01-09 RX ORDER — SODIUM CHLORIDE 9 MG/ML
2000 INJECTION INTRAMUSCULAR; INTRAVENOUS; SUBCUTANEOUS ONCE
Qty: 0 | Refills: 0 | Status: COMPLETED | OUTPATIENT
Start: 2018-01-09 | End: 2018-01-09

## 2018-01-09 RX ORDER — AZITHROMYCIN 250 MG/1
250 TABLET, FILM COATED ORAL
Qty: 1 | Refills: 0 | Status: COMPLETED | COMMUNITY
Start: 2018-01-09 | End: 2018-01-14

## 2018-01-09 RX ADMIN — ONDANSETRON 4 MILLIGRAM(S): 8 TABLET, FILM COATED ORAL at 23:13

## 2018-01-09 RX ADMIN — HYDROMORPHONE HYDROCHLORIDE 1 MILLIGRAM(S): 2 INJECTION INTRAMUSCULAR; INTRAVENOUS; SUBCUTANEOUS at 23:46

## 2018-01-09 RX ADMIN — MORPHINE SULFATE 4 MILLIGRAM(S): 50 CAPSULE, EXTENDED RELEASE ORAL at 22:39

## 2018-01-09 RX ADMIN — SODIUM CHLORIDE 2000 MILLILITER(S): 9 INJECTION INTRAMUSCULAR; INTRAVENOUS; SUBCUTANEOUS at 22:37

## 2018-01-09 RX ADMIN — ONDANSETRON 4 MILLIGRAM(S): 8 TABLET, FILM COATED ORAL at 22:39

## 2018-01-09 NOTE — ED ADULT NURSE NOTE - OBJECTIVE STATEMENT
28y/o male presents to Ed via wheelchair a&ox3 c/o abdominal pain. Patient presents very loud and anxious, yelling in pain, eyes rolling back. Patient accompanied by friend who reports patient has history of 2 surgeries for his appendix. States pain has been ongoing for about a month but today it became more severe, accompanied by multiple episodes of vomiting and diarrhea. No blood in stool or vomit. Denies recent travel, drug/alcohol use, SOB, CP, urinary symptoms, fever. Lungs clear b/l. And soft, nondistended, +tenderness with palpation of RLQ. MD at bedside for eval. Safety and comfort maintained.

## 2018-01-09 NOTE — ED PROVIDER NOTE - OBJECTIVE STATEMENT
28yo male p/w vomiting, diarrhea, and worsening RLQ pain for 1 day. Pt. with chronic RLQ pain for 1 month. Pt. taking azithromycin for 1 day, started by Dr. Ching today, for unknown reason. Pt. not taking any pain medicaitons at home. Pt. is s/p appendectomy. Denies fevers, chest pain, shortness of breath, dysuria/hematuria. Pain is RLQ, no aggravating/alleviating factors, sharp, 10/10. Pt. does not report alcohol abuse.

## 2018-01-09 NOTE — ED PROVIDER NOTE - ATTENDING CONTRIBUTION TO CARE
27 male sp appy, abdominal pain, acute on chronic has had poss sbo w neg laproscopy in past. on exam diffuse ttp. R testicle nl size but tender, also w R inguinal region ttp. us testes, labs, ctap, Symptomatic treatment. re-assess. prob sbo.

## 2018-01-09 NOTE — ED PROVIDER NOTE - PLAN OF CARE
You were seen in the Emergency Department for abdominal pain. Your examination and lab tests were reassuring. Please follow up with your regular physician this week for reevaluation. Please follow up with gastroenterology. You were seen in the Emergency Department for abdominal pain. Your examination and lab tests were reassuring. Please follow up with your regular physician this week for reevaluation. Please follow up with gastroenterology. Please return to the Emergency Department if you have any new concerning symptoms such as severe pain, weakness. or any other concerning symptoms.

## 2018-01-09 NOTE — ED PROVIDER NOTE - CARE PLAN
Principal Discharge DX:	Abdominal pain  Instructions for follow-up, activity and diet:	You were seen in the Emergency Department for abdominal pain. Your examination and lab tests were reassuring. Please follow up with your regular physician this week for reevaluation. Please follow up with gastroenterology. Principal Discharge DX:	Abdominal pain  Instructions for follow-up, activity and diet:	You were seen in the Emergency Department for abdominal pain. Your examination and lab tests were reassuring. Please follow up with your regular physician this week for reevaluation. Please follow up with gastroenterology. Please return to the Emergency Department if you have any new concerning symptoms such as severe pain, weakness. or any other concerning symptoms.

## 2018-01-10 ENCOUNTER — OUTPATIENT (OUTPATIENT)
Dept: OUTPATIENT SERVICES | Facility: HOSPITAL | Age: 28
LOS: 1 days | End: 2018-01-10
Payer: COMMERCIAL

## 2018-01-10 ENCOUNTER — EMERGENCY (EMERGENCY)
Facility: HOSPITAL | Age: 28
LOS: 1 days | Discharge: HOME | End: 2018-01-10
Attending: EMERGENCY MEDICINE | Admitting: EMERGENCY MEDICINE
Payer: COMMERCIAL

## 2018-01-10 ENCOUNTER — APPOINTMENT (OUTPATIENT)
Dept: CT IMAGING | Facility: CLINIC | Age: 28
End: 2018-01-10
Payer: COMMERCIAL

## 2018-01-10 VITALS
RESPIRATION RATE: 20 BRPM | OXYGEN SATURATION: 100 % | HEART RATE: 69 BPM | TEMPERATURE: 98 F | SYSTOLIC BLOOD PRESSURE: 109 MMHG | DIASTOLIC BLOOD PRESSURE: 68 MMHG

## 2018-01-10 VITALS
HEART RATE: 80 BPM | OXYGEN SATURATION: 98 % | DIASTOLIC BLOOD PRESSURE: 54 MMHG | RESPIRATION RATE: 16 BRPM | TEMPERATURE: 98 F | SYSTOLIC BLOOD PRESSURE: 103 MMHG

## 2018-01-10 DIAGNOSIS — R93.8 ABNORMAL FINDINGS ON DIAGNOSTIC IMAGING OF OTHER SPECIFIED BODY STRUCTURES: ICD-10-CM

## 2018-01-10 DIAGNOSIS — Z90.49 ACQUIRED ABSENCE OF OTHER SPECIFIED PARTS OF DIGESTIVE TRACT: Chronic | ICD-10-CM

## 2018-01-10 LAB
APPEARANCE UR: CLEAR — SIGNIFICANT CHANGE UP
BACTERIA THROAT CULT: NORMAL
BASOPHILS # BLD AUTO: 0.1 K/UL — SIGNIFICANT CHANGE UP (ref 0–0.2)
BASOPHILS NFR BLD AUTO: 0.6 % — SIGNIFICANT CHANGE UP (ref 0–2)
BILIRUB UR-MCNC: NEGATIVE — SIGNIFICANT CHANGE UP
COLOR SPEC: YELLOW — SIGNIFICANT CHANGE UP
DIFF PNL FLD: NEGATIVE — SIGNIFICANT CHANGE UP
EOSINOPHIL # BLD AUTO: 0 K/UL — SIGNIFICANT CHANGE UP (ref 0–0.5)
EOSINOPHIL NFR BLD AUTO: 0.1 % — SIGNIFICANT CHANGE UP (ref 0–6)
GLUCOSE UR QL: NEGATIVE — SIGNIFICANT CHANGE UP
KETONES UR-MCNC: ABNORMAL
LACTATE BLDV-MCNC: 0.8 MMOL/L — SIGNIFICANT CHANGE UP (ref 0.7–2)
LEUKOCYTE ESTERASE UR-ACNC: NEGATIVE — SIGNIFICANT CHANGE UP
LYMPHOCYTES # BLD AUTO: 1.7 K/UL — SIGNIFICANT CHANGE UP (ref 1–3.3)
LYMPHOCYTES # BLD AUTO: 21 % — SIGNIFICANT CHANGE UP (ref 13–44)
MONOCYTES # BLD AUTO: 1.5 K/UL — HIGH (ref 0–0.9)
MONOCYTES NFR BLD AUTO: 17.6 % — HIGH (ref 2–14)
NEUTROPHILS # BLD AUTO: 5 K/UL — SIGNIFICANT CHANGE UP (ref 1.8–7.4)
NEUTROPHILS NFR BLD AUTO: 60.6 % — SIGNIFICANT CHANGE UP (ref 43–77)
NITRITE UR-MCNC: NEGATIVE — SIGNIFICANT CHANGE UP
PH UR: 7 — SIGNIFICANT CHANGE UP (ref 5–8)
PLAT MORPH BLD: NORMAL — SIGNIFICANT CHANGE UP
PROT UR-MCNC: NEGATIVE — SIGNIFICANT CHANGE UP
RBC BLD AUTO: SIGNIFICANT CHANGE UP
RBC CASTS # UR COMP ASSIST: SIGNIFICANT CHANGE UP /HPF (ref 0–2)
SP GR SPEC: >1.03 — HIGH (ref 1.01–1.02)
UROBILINOGEN FLD QL: NEGATIVE — SIGNIFICANT CHANGE UP
WBC UR QL: SIGNIFICANT CHANGE UP /HPF (ref 0–5)

## 2018-01-10 PROCEDURE — 71250 CT THORAX DX C-: CPT | Mod: 26

## 2018-01-10 PROCEDURE — 76870 US EXAM SCROTUM: CPT

## 2018-01-10 PROCEDURE — 80053 COMPREHEN METABOLIC PANEL: CPT

## 2018-01-10 PROCEDURE — 96375 TX/PRO/DX INJ NEW DRUG ADDON: CPT

## 2018-01-10 PROCEDURE — 93010 ELECTROCARDIOGRAM REPORT: CPT | Mod: 59

## 2018-01-10 PROCEDURE — 74177 CT ABD & PELVIS W/CONTRAST: CPT | Mod: 26

## 2018-01-10 PROCEDURE — 93976 VASCULAR STUDY: CPT | Mod: 26

## 2018-01-10 PROCEDURE — 84295 ASSAY OF SERUM SODIUM: CPT

## 2018-01-10 PROCEDURE — 96374 THER/PROPH/DIAG INJ IV PUSH: CPT | Mod: XU

## 2018-01-10 PROCEDURE — 85027 COMPLETE CBC AUTOMATED: CPT

## 2018-01-10 PROCEDURE — 93975 VASCULAR STUDY: CPT

## 2018-01-10 PROCEDURE — 84132 ASSAY OF SERUM POTASSIUM: CPT

## 2018-01-10 PROCEDURE — 82565 ASSAY OF CREATININE: CPT

## 2018-01-10 PROCEDURE — 82330 ASSAY OF CALCIUM: CPT

## 2018-01-10 PROCEDURE — 83690 ASSAY OF LIPASE: CPT

## 2018-01-10 PROCEDURE — 82435 ASSAY OF BLOOD CHLORIDE: CPT

## 2018-01-10 PROCEDURE — 93975 VASCULAR STUDY: CPT | Mod: 26

## 2018-01-10 PROCEDURE — 83605 ASSAY OF LACTIC ACID: CPT

## 2018-01-10 PROCEDURE — 76870 US EXAM SCROTUM: CPT | Mod: 26

## 2018-01-10 PROCEDURE — 82803 BLOOD GASES ANY COMBINATION: CPT

## 2018-01-10 PROCEDURE — 71250 CT THORAX DX C-: CPT

## 2018-01-10 PROCEDURE — 99284 EMERGENCY DEPT VISIT MOD MDM: CPT | Mod: 25

## 2018-01-10 PROCEDURE — 81001 URINALYSIS AUTO W/SCOPE: CPT

## 2018-01-10 PROCEDURE — 85014 HEMATOCRIT: CPT

## 2018-01-10 PROCEDURE — 74177 CT ABD & PELVIS W/CONTRAST: CPT

## 2018-01-10 PROCEDURE — 82947 ASSAY GLUCOSE BLOOD QUANT: CPT

## 2018-01-10 PROCEDURE — 93976 VASCULAR STUDY: CPT

## 2018-01-10 PROCEDURE — 87086 URINE CULTURE/COLONY COUNT: CPT

## 2018-01-10 NOTE — ED ADULT NURSE NOTE - OBJECTIVE STATEMENT
26 y/o male with no med problem came in c/o severe abdominal pain with nausea & vomiting, 10/10 on pain scale. Pt was here yesterday for the same complaint. Abdomen soft, non tender & non distended, denies any diarrhea or constipation, no chest pain or SOB, no fever/chills. Safety maintained & continue monitor

## 2018-01-10 NOTE — ED ADULT NURSE REASSESSMENT NOTE - NS ED NURSE REASSESS COMMENT FT1
pPatient much more calm, able to hold conversation. States Dilaudid helped his pain. Pending urine and dispo.

## 2018-01-11 VITALS
SYSTOLIC BLOOD PRESSURE: 134 MMHG | DIASTOLIC BLOOD PRESSURE: 70 MMHG | RESPIRATION RATE: 17 BRPM | HEART RATE: 78 BPM | TEMPERATURE: 98 F | OXYGEN SATURATION: 100 %

## 2018-01-11 LAB
ALBUMIN SERPL ELPH-MCNC: 4.2 G/DL — SIGNIFICANT CHANGE UP (ref 3.3–5)
ALP SERPL-CCNC: 51 U/L — SIGNIFICANT CHANGE UP (ref 40–120)
ALT FLD-CCNC: 30 U/L RC — SIGNIFICANT CHANGE UP (ref 10–45)
ANION GAP SERPL CALC-SCNC: 19 MMOL/L — HIGH (ref 5–17)
AST SERPL-CCNC: 26 U/L — SIGNIFICANT CHANGE UP (ref 10–40)
BILIRUB SERPL-MCNC: 0.9 MG/DL — SIGNIFICANT CHANGE UP (ref 0.2–1.2)
BUN SERPL-MCNC: 11 MG/DL — SIGNIFICANT CHANGE UP (ref 7–23)
CALCIUM SERPL-MCNC: 9.2 MG/DL — SIGNIFICANT CHANGE UP (ref 8.4–10.5)
CHLORIDE SERPL-SCNC: 102 MMOL/L — SIGNIFICANT CHANGE UP (ref 96–108)
CO2 SERPL-SCNC: 20 MMOL/L — LOW (ref 22–31)
CREAT SERPL-MCNC: 1.01 MG/DL — SIGNIFICANT CHANGE UP (ref 0.5–1.3)
CULTURE RESULTS: NO GROWTH — SIGNIFICANT CHANGE UP
GLUCOSE SERPL-MCNC: 148 MG/DL — HIGH (ref 70–99)
HCT VFR BLD CALC: 42.3 % — SIGNIFICANT CHANGE UP (ref 39–50)
HGB BLD-MCNC: 14.8 G/DL — SIGNIFICANT CHANGE UP (ref 13–17)
LIDOCAIN IGE QN: 32 U/L — SIGNIFICANT CHANGE UP (ref 7–60)
MCHC RBC-ENTMCNC: 28.5 PG — SIGNIFICANT CHANGE UP (ref 27–34)
MCHC RBC-ENTMCNC: 34.9 GM/DL — SIGNIFICANT CHANGE UP (ref 32–36)
MCV RBC AUTO: 81.5 FL — SIGNIFICANT CHANGE UP (ref 80–100)
PLATELET # BLD AUTO: 163 K/UL — SIGNIFICANT CHANGE UP (ref 150–400)
POTASSIUM SERPL-MCNC: 3.8 MMOL/L — SIGNIFICANT CHANGE UP (ref 3.5–5.3)
POTASSIUM SERPL-SCNC: 3.8 MMOL/L — SIGNIFICANT CHANGE UP (ref 3.5–5.3)
PROT SERPL-MCNC: 7.6 G/DL — SIGNIFICANT CHANGE UP (ref 6–8.3)
RBC # BLD: 5.19 M/UL — SIGNIFICANT CHANGE UP (ref 4.2–5.8)
RBC # FLD: 11.8 % — SIGNIFICANT CHANGE UP (ref 10.3–14.5)
SODIUM SERPL-SCNC: 141 MMOL/L — SIGNIFICANT CHANGE UP (ref 135–145)
SPECIMEN SOURCE: SIGNIFICANT CHANGE UP
WBC # BLD: 9.4 K/UL — SIGNIFICANT CHANGE UP (ref 3.8–10.5)
WBC # FLD AUTO: 9.4 K/UL — SIGNIFICANT CHANGE UP (ref 3.8–10.5)

## 2018-01-11 PROCEDURE — 96374 THER/PROPH/DIAG INJ IV PUSH: CPT

## 2018-01-11 PROCEDURE — 93005 ELECTROCARDIOGRAM TRACING: CPT

## 2018-01-11 PROCEDURE — 99284 EMERGENCY DEPT VISIT MOD MDM: CPT | Mod: 25

## 2018-01-11 PROCEDURE — 85027 COMPLETE CBC AUTOMATED: CPT

## 2018-01-11 PROCEDURE — 96372 THER/PROPH/DIAG INJ SC/IM: CPT | Mod: XU

## 2018-01-11 PROCEDURE — 96375 TX/PRO/DX INJ NEW DRUG ADDON: CPT

## 2018-01-11 PROCEDURE — 83690 ASSAY OF LIPASE: CPT

## 2018-01-11 PROCEDURE — 80053 COMPREHEN METABOLIC PANEL: CPT

## 2018-01-11 RX ORDER — ONDANSETRON 8 MG/1
4 TABLET, FILM COATED ORAL ONCE
Qty: 0 | Refills: 0 | Status: COMPLETED | OUTPATIENT
Start: 2018-01-11 | End: 2018-01-11

## 2018-01-11 RX ORDER — ONDANSETRON 8 MG/1
1 TABLET, FILM COATED ORAL
Qty: 9 | Refills: 0 | OUTPATIENT
Start: 2018-01-11 | End: 2018-01-13

## 2018-01-11 RX ORDER — SODIUM CHLORIDE 9 MG/ML
2000 INJECTION INTRAMUSCULAR; INTRAVENOUS; SUBCUTANEOUS ONCE
Qty: 0 | Refills: 0 | Status: COMPLETED | OUTPATIENT
Start: 2018-01-11 | End: 2018-01-11

## 2018-01-11 RX ORDER — ACETAMINOPHEN 500 MG
1000 TABLET ORAL ONCE
Qty: 0 | Refills: 0 | Status: COMPLETED | OUTPATIENT
Start: 2018-01-11 | End: 2018-01-11

## 2018-01-11 RX ORDER — KETOROLAC TROMETHAMINE 30 MG/ML
30 SYRINGE (ML) INJECTION ONCE
Qty: 0 | Refills: 0 | Status: DISCONTINUED | OUTPATIENT
Start: 2018-01-11 | End: 2018-01-11

## 2018-01-11 RX ORDER — SODIUM CHLORIDE 9 MG/ML
2000 INJECTION, SOLUTION INTRAVENOUS ONCE
Qty: 0 | Refills: 0 | Status: COMPLETED | OUTPATIENT
Start: 2018-01-11 | End: 2018-01-11

## 2018-01-11 RX ORDER — DIAZEPAM 5 MG
1 TABLET ORAL
Qty: 9 | Refills: 0 | OUTPATIENT
Start: 2018-01-11 | End: 2018-01-13

## 2018-01-11 RX ORDER — HALOPERIDOL DECANOATE 100 MG/ML
5 INJECTION INTRAMUSCULAR ONCE
Qty: 0 | Refills: 0 | Status: COMPLETED | OUTPATIENT
Start: 2018-01-11 | End: 2018-01-11

## 2018-01-11 RX ADMIN — Medication 30 MILLIGRAM(S): at 02:43

## 2018-01-11 RX ADMIN — ONDANSETRON 4 MILLIGRAM(S): 8 TABLET, FILM COATED ORAL at 00:56

## 2018-01-11 RX ADMIN — SODIUM CHLORIDE 1000 MILLILITER(S): 9 INJECTION INTRAMUSCULAR; INTRAVENOUS; SUBCUTANEOUS at 00:53

## 2018-01-11 RX ADMIN — Medication 1000 MILLIGRAM(S): at 02:43

## 2018-01-11 RX ADMIN — Medication 30 MILLIGRAM(S): at 00:52

## 2018-01-11 RX ADMIN — Medication 400 MILLIGRAM(S): at 00:52

## 2018-01-11 RX ADMIN — SODIUM CHLORIDE 1000 MILLILITER(S): 9 INJECTION, SOLUTION INTRAVENOUS at 02:48

## 2018-01-11 RX ADMIN — ONDANSETRON 4 MILLIGRAM(S): 8 TABLET, FILM COATED ORAL at 00:53

## 2018-01-11 RX ADMIN — HALOPERIDOL DECANOATE 5 MILLIGRAM(S): 100 INJECTION INTRAMUSCULAR at 00:53

## 2018-01-11 RX ADMIN — Medication 0.5 MILLIGRAM(S): at 00:57

## 2018-01-11 NOTE — ED PROVIDER NOTE - CARE PLAN
Principal Discharge DX:	Right lower quadrant abdominal pain  Secondary Diagnosis:	Dehydration  Secondary Diagnosis:	Nausea

## 2018-01-11 NOTE — ED PROVIDER NOTE - MEDICAL DECISION MAKING DETAILS
Acute on chronic pain with non-surgical/non-infectious ED w/u within past 24 hrs including CT and US. Will trail ativan for spasm control and haldol for cyclical vomiting control. Check electrolytes and IV hydration.

## 2018-01-11 NOTE — ED PROVIDER NOTE - CONSTITUTIONAL, MLM
normal... Well appearing, well nourished, awake, alert, oriented to person, place, time/situation. Intermittently screaming and thrashing on stretcher but able to fluently answer questions.

## 2018-01-11 NOTE — ED PROVIDER NOTE - OBJECTIVE STATEMENT
27M s/p appendectomy with 1 month RLQ pain followed by pain management receiving nerve blocks p/w acute on chronic severe RLQ pain a/w N/W for several hours. Pt seen in ED yesterday with similar presentation. CT a/p and testicular US unremarkable. Pain improved with hydration and IV Dilaudid. Pt was to have second nerve surinder this week but is has been delayed due to insurance authorization.

## 2018-01-11 NOTE — ED PROVIDER NOTE - PROGRESS NOTE DETAILS
Patient sleeping since receiving medication. 4L IVF given. Tolerated PO. Will f/u with pain management. Valium prescription cancelled. [facility] called. DIRK MENDEZ.

## 2018-01-12 ENCOUNTER — EMERGENCY (EMERGENCY)
Facility: HOSPITAL | Age: 28
LOS: 1 days | End: 2018-01-12
Attending: EMERGENCY MEDICINE | Admitting: EMERGENCY MEDICINE
Payer: COMMERCIAL

## 2018-01-12 VITALS
RESPIRATION RATE: 18 BRPM | HEIGHT: 72 IN | SYSTOLIC BLOOD PRESSURE: 127 MMHG | TEMPERATURE: 98 F | WEIGHT: 184.97 LBS | HEART RATE: 79 BPM | OXYGEN SATURATION: 99 % | DIASTOLIC BLOOD PRESSURE: 81 MMHG

## 2018-01-12 VITALS — OXYGEN SATURATION: 99 % | RESPIRATION RATE: 19 BRPM

## 2018-01-12 DIAGNOSIS — Z90.49 ACQUIRED ABSENCE OF OTHER SPECIFIED PARTS OF DIGESTIVE TRACT: Chronic | ICD-10-CM

## 2018-01-12 PROCEDURE — 99283 EMERGENCY DEPT VISIT LOW MDM: CPT

## 2018-01-12 RX ORDER — HYDROMORPHONE HYDROCHLORIDE 2 MG/1
2 TABLET ORAL DAILY
Qty: 10 | Refills: 0 | Status: DISCONTINUED | COMMUNITY
Start: 2018-01-11 | End: 2018-01-12

## 2018-01-12 RX ORDER — DIPHENHYDRAMINE HCL 50 MG
50 CAPSULE ORAL ONCE
Qty: 0 | Refills: 0 | Status: COMPLETED | OUTPATIENT
Start: 2018-01-12 | End: 2018-01-12

## 2018-01-12 RX ORDER — METOCLOPRAMIDE 5 MG/1
5 TABLET ORAL
Qty: 30 | Refills: 0 | Status: COMPLETED | COMMUNITY
Start: 2018-01-12 | End: 2018-01-22

## 2018-01-12 RX ORDER — ONDANSETRON HYDROCHLORIDE 4 MG/1
4 TABLET, ORALLY DISINTEGRATING ORAL 3 TIMES DAILY
Qty: 1 | Refills: 0 | Status: DISCONTINUED | COMMUNITY
Start: 2018-01-11 | End: 2018-01-12

## 2018-01-12 RX ADMIN — Medication 50 MILLIGRAM(S): at 17:08

## 2018-01-12 NOTE — ED PROVIDER NOTE - CARE PLAN
Principal Discharge DX:	Dystonic drug reaction  Instructions for follow-up, activity and diet:	Take Benadryl 25 mg oral every 8 hours as needed for agitation and dystonic movements  Do not drive after taking this medication  Follow up with your Primary Care Physician within the next 2-3 days  Discontinue Use of Odansetron (zofran)   Return to the Emergency Room if you experience new or worsening symptoms

## 2018-01-12 NOTE — ED PROVIDER NOTE - PLAN OF CARE
Take Benadryl 25 mg oral every 8 hours as needed for agitation and dystonic movements  Do not drive after taking this medication  Follow up with your Primary Care Physician within the next 2-3 days  Discontinue Use of Odansetron (zofran)   Return to the Emergency Room if you experience new or worsening symptoms

## 2018-01-12 NOTE — ED ADULT NURSE NOTE - OBJECTIVE STATEMENT
Male 27 years old with history of GERD, came in for jaw pain, feels jittery after taking Zofran tablet. Pt was here 2 nights ago because of nausea and vomiting and was given Zofran IV. Reports he was sent home with po Zofran and feels "something not right" after. Stated he feels the above symptoms. Denies vomiting. sob, fever and chills and throat closing and difficulty of breathing. Able to tolerate liquids. No rash noted. Will monitor.

## 2018-01-12 NOTE — ED PROVIDER NOTE - MEDICAL DECISION MAKING DETAILS
Juan Manuel: Patient with neck spasms, twitching s/p zofran odt ingestion. no neuro deficits.  alert and oriented x 3,will give benadryl.

## 2018-01-12 NOTE — ED PROVIDER NOTE - OBJECTIVE STATEMENT
27 year old male w Anal Fissure, GERD was in the ED yesterday and sent home on Zofran after normal CT scan of abdomen and normal labs. He reports that since starting zofran he has been experiencing muscle tightness and twitching of the muscles of the left neck, and the feeling like he needs to move the right leg. Denies rash, wheezing, or swelling in the throat. He is concerned that he is having a reaction to the medication. The abdominal pain and nausea have both resolved.

## 2018-01-25 ENCOUNTER — EMERGENCY (EMERGENCY)
Facility: HOSPITAL | Age: 28
LOS: 1 days | Discharge: OTHER INSTITUTION | End: 2018-01-25
Attending: EMERGENCY MEDICINE | Admitting: EMERGENCY MEDICINE
Payer: COMMERCIAL

## 2018-01-25 VITALS
RESPIRATION RATE: 18 BRPM | HEIGHT: 72 IN | OXYGEN SATURATION: 98 % | HEART RATE: 94 BPM | DIASTOLIC BLOOD PRESSURE: 80 MMHG | TEMPERATURE: 99 F | SYSTOLIC BLOOD PRESSURE: 150 MMHG | WEIGHT: 184.97 LBS

## 2018-01-25 DIAGNOSIS — F31.2 BIPOLAR DISORDER, CURRENT EPISODE MANIC SEVERE WITH PSYCHOTIC FEATURES: ICD-10-CM

## 2018-01-25 DIAGNOSIS — F12.10 CANNABIS ABUSE, UNCOMPLICATED: ICD-10-CM

## 2018-01-25 DIAGNOSIS — Z90.49 ACQUIRED ABSENCE OF OTHER SPECIFIED PARTS OF DIGESTIVE TRACT: Chronic | ICD-10-CM

## 2018-01-25 LAB
ALBUMIN SERPL ELPH-MCNC: 4.8 G/DL — SIGNIFICANT CHANGE UP (ref 3.3–5)
ALP SERPL-CCNC: 54 U/L — SIGNIFICANT CHANGE UP (ref 40–120)
ALT FLD-CCNC: 18 U/L RC — SIGNIFICANT CHANGE UP (ref 10–45)
ANION GAP SERPL CALC-SCNC: 17 MMOL/L — SIGNIFICANT CHANGE UP (ref 5–17)
APAP SERPL-MCNC: <15 UG/ML — SIGNIFICANT CHANGE UP (ref 10–30)
APPEARANCE UR: CLEAR — SIGNIFICANT CHANGE UP
AST SERPL-CCNC: 22 U/L — SIGNIFICANT CHANGE UP (ref 10–40)
BACTERIA # UR AUTO: ABNORMAL /HPF
BILIRUB SERPL-MCNC: 1.2 MG/DL — SIGNIFICANT CHANGE UP (ref 0.2–1.2)
BILIRUB UR-MCNC: NEGATIVE — SIGNIFICANT CHANGE UP
BUN SERPL-MCNC: 8 MG/DL — SIGNIFICANT CHANGE UP (ref 7–23)
CALCIUM SERPL-MCNC: 9.8 MG/DL — SIGNIFICANT CHANGE UP (ref 8.4–10.5)
CHLORIDE SERPL-SCNC: 102 MMOL/L — SIGNIFICANT CHANGE UP (ref 96–108)
CO2 SERPL-SCNC: 23 MMOL/L — SIGNIFICANT CHANGE UP (ref 22–31)
COLOR SPEC: YELLOW — SIGNIFICANT CHANGE UP
CREAT SERPL-MCNC: 1.03 MG/DL — SIGNIFICANT CHANGE UP (ref 0.5–1.3)
DIFF PNL FLD: NEGATIVE — SIGNIFICANT CHANGE UP
GLUCOSE SERPL-MCNC: 99 MG/DL — SIGNIFICANT CHANGE UP (ref 70–99)
GLUCOSE UR QL: NEGATIVE — SIGNIFICANT CHANGE UP
HCT VFR BLD CALC: 44.3 % — SIGNIFICANT CHANGE UP (ref 39–50)
HGB BLD-MCNC: 15.7 G/DL — SIGNIFICANT CHANGE UP (ref 13–17)
KETONES UR-MCNC: ABNORMAL
LEUKOCYTE ESTERASE UR-ACNC: NEGATIVE — SIGNIFICANT CHANGE UP
MCHC RBC-ENTMCNC: 28.9 PG — SIGNIFICANT CHANGE UP (ref 27–34)
MCHC RBC-ENTMCNC: 35.5 GM/DL — SIGNIFICANT CHANGE UP (ref 32–36)
MCV RBC AUTO: 81.4 FL — SIGNIFICANT CHANGE UP (ref 80–100)
NITRITE UR-MCNC: NEGATIVE — SIGNIFICANT CHANGE UP
PH UR: 6 — SIGNIFICANT CHANGE UP (ref 5–8)
PLATELET # BLD AUTO: 264 K/UL — SIGNIFICANT CHANGE UP (ref 150–400)
POTASSIUM SERPL-MCNC: 3.6 MMOL/L — SIGNIFICANT CHANGE UP (ref 3.5–5.3)
POTASSIUM SERPL-SCNC: 3.6 MMOL/L — SIGNIFICANT CHANGE UP (ref 3.5–5.3)
PROT SERPL-MCNC: 8.4 G/DL — HIGH (ref 6–8.3)
PROT UR-MCNC: 30 MG/DL
RBC # BLD: 5.45 M/UL — SIGNIFICANT CHANGE UP (ref 4.2–5.8)
RBC # FLD: 12.2 % — SIGNIFICANT CHANGE UP (ref 10.3–14.5)
RBC CASTS # UR COMP ASSIST: SIGNIFICANT CHANGE UP /HPF (ref 0–2)
SALICYLATES SERPL-MCNC: <2 MG/DL — LOW (ref 15–30)
SODIUM SERPL-SCNC: 142 MMOL/L — SIGNIFICANT CHANGE UP (ref 135–145)
SP GR SPEC: 1.02 — SIGNIFICANT CHANGE UP (ref 1.01–1.02)
UROBILINOGEN FLD QL: NEGATIVE — SIGNIFICANT CHANGE UP
WBC # BLD: 9.9 K/UL — SIGNIFICANT CHANGE UP (ref 3.8–10.5)
WBC # FLD AUTO: 9.9 K/UL — SIGNIFICANT CHANGE UP (ref 3.8–10.5)
WBC UR QL: SIGNIFICANT CHANGE UP /HPF (ref 0–5)

## 2018-01-25 PROCEDURE — 71046 X-RAY EXAM CHEST 2 VIEWS: CPT

## 2018-01-25 PROCEDURE — 99285 EMERGENCY DEPT VISIT HI MDM: CPT | Mod: 25

## 2018-01-25 PROCEDURE — 85027 COMPLETE CBC AUTOMATED: CPT

## 2018-01-25 PROCEDURE — 80053 COMPREHEN METABOLIC PANEL: CPT

## 2018-01-25 PROCEDURE — 70450 CT HEAD/BRAIN W/O DYE: CPT | Mod: 26

## 2018-01-25 PROCEDURE — 80307 DRUG TEST PRSMV CHEM ANLYZR: CPT

## 2018-01-25 PROCEDURE — 99285 EMERGENCY DEPT VISIT HI MDM: CPT

## 2018-01-25 PROCEDURE — 93005 ELECTROCARDIOGRAM TRACING: CPT

## 2018-01-25 PROCEDURE — 70450 CT HEAD/BRAIN W/O DYE: CPT

## 2018-01-25 PROCEDURE — 71046 X-RAY EXAM CHEST 2 VIEWS: CPT | Mod: 26

## 2018-01-25 PROCEDURE — 93010 ELECTROCARDIOGRAM REPORT: CPT | Mod: 59

## 2018-01-25 PROCEDURE — 81001 URINALYSIS AUTO W/SCOPE: CPT

## 2018-01-25 RX ORDER — IBUPROFEN 200 MG
600 TABLET ORAL ONCE
Qty: 0 | Refills: 0 | Status: COMPLETED | OUTPATIENT
Start: 2018-01-25 | End: 2018-01-25

## 2018-01-25 RX ADMIN — Medication 600 MILLIGRAM(S): at 23:35

## 2018-01-25 RX ADMIN — Medication 600 MILLIGRAM(S): at 18:35

## 2018-01-25 NOTE — ED ADULT NURSE NOTE - OBJECTIVE STATEMENT
28 y/o male presenting to the ED complaining of vomiting x 2 weeks. Patient states has not vomited in 4 days. Per family patient was diagnosed with bronchitis 2 weeks ago but was unable to take antibiotics due to vomiting. Per family positive weight loss. Sister states patient has onset of irrational thoughts and irrational behavior x 4 days. Per family patient has been speaking about prophets and acting very "hyper". On arrival patient is able to maintain conversation with minimal distractions. Patient maintained eye contact. Patient denies SI/HI. Patient denies abdominal pain, dizziness, n/v/d, SOB, numbness, tingling, chest pain. Safety and comfort measures provided. Family at bedside. A&OX3. One to one placed on arrival. Security wanded patient. Belongings secure with patient's family.

## 2018-01-25 NOTE — ED BEHAVIORAL HEALTH ASSESSMENT NOTE - SUMMARY
pt is 27 ys male, with no prior psychiatric history , presented with manic sx as elated mood, pressure speech, sever irritability at home, violence, lack of sleep, increased energy, grandiosity, religiously preoccupied.   Stressor : overwhelmed by living with his family, father passed away last year. Substance abuse. He denied any SI or plans.

## 2018-01-25 NOTE — ED BEHAVIORAL HEALTH ASSESSMENT NOTE - SUICIDE PROTECTIVE FACTORS
Future oriented/Fear of death or dying due to pain/suffering/High spirituality/Responsibility to family and others/Supportive social network or family

## 2018-01-25 NOTE — ED PROVIDER NOTE - NS_ ATTENDINGSCRIBEDETAILS _ED_A_ED_FT
I,Miryam Lopez, performed the initial face to face bedside interview with this patient regarding history of present illness, review of symptoms and relevant past medical, social and family history.  I completed an independent physical examination.  I was the initial provider who evaluated this patient. The history, relevant review of systems, past medical and surgical history, medical decision making, and physical examination was documented by the scribe in my presence and I attest to the accuracy of the documentation.

## 2018-01-25 NOTE — ED PROVIDER NOTE - PROGRESS NOTE DETAILS
ATTENDING MD Aiyana: metabolic w/u negative. seen by psych who plans 2PC admission to [facility]. Pt stable Will be going to [facility], accepting doc pending. Accepted by Dr. Cornelius

## 2018-01-25 NOTE — ED PROVIDER NOTE - INTERPRETATION
normal sinus rhythm, Normal axis, Normal TX interval and QRS complex. There are no acute ischemic ST or T-wave changes.

## 2018-01-25 NOTE — ED PROVIDER NOTE - MEDICAL DECISION MAKING DETAILS
26 yo M brought in for psychiatric evaluation by family for odd behavior/ hallucinations. Patient denies hallucinations/ SI/ HI. ? hx of schizophrenia in family. Patient has chronic abdominal pain, vomiting which has been extensively evaluated. exam consistent with chronic abdominal pain. Patient to be evaluated by psych.

## 2018-01-25 NOTE — ED BEHAVIORAL HEALTH ASSESSMENT NOTE - RISK ASSESSMENT
mild suicide risk : future oriented, responsibility towards family, denied any SI or plans, He has high spirituality . believes suicide is against his Sikhism and suicide is selfish.
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
## 2018-01-25 NOTE — ED ADULT TRIAGE NOTE - CHIEF COMPLAINT QUOTE
pt was sent by PMD to the ER to get a chest xray to r/o pneumonia, cough and vomiting for 2 weeks pt was sent by PMD to the ER to get a chest xray to r/o pneumonia, cough and vomiting for 2 weeks, according to the sister "pt was also brought to the ER for new onset manic behaviour"

## 2018-01-25 NOTE — ED PROVIDER NOTE - CHIEF COMPLAINT
The patient is a 27y Male complaining of vomiting. The patient is a 27y Male brought in for psychiatric evaluation.

## 2018-01-26 ENCOUNTER — INPATIENT (INPATIENT)
Facility: HOSPITAL | Age: 28
LOS: 3 days | Discharge: HOME | End: 2018-01-30
Attending: PSYCHIATRY & NEUROLOGY | Admitting: PSYCHIATRY & NEUROLOGY
Payer: COMMERCIAL

## 2018-01-26 VITALS — WEIGHT: 169.98 LBS | TEMPERATURE: 98 F | HEIGHT: 70 IN

## 2018-01-26 DIAGNOSIS — Z90.49 ACQUIRED ABSENCE OF OTHER SPECIFIED PARTS OF DIGESTIVE TRACT: Chronic | ICD-10-CM

## 2018-01-26 DIAGNOSIS — F31.9 BIPOLAR DISORDER, UNSPECIFIED: ICD-10-CM

## 2018-01-26 PROCEDURE — 99233 SBSQ HOSP IP/OBS HIGH 50: CPT

## 2018-01-26 RX ORDER — ACETAMINOPHEN 500 MG
650 TABLET ORAL ONCE
Qty: 0 | Refills: 0 | Status: COMPLETED | OUTPATIENT
Start: 2018-01-26 | End: 2018-01-26

## 2018-01-26 RX ORDER — DIPHENHYDRAMINE HCL 50 MG
50 CAPSULE ORAL ONCE
Qty: 0 | Refills: 0 | Status: DISCONTINUED | OUTPATIENT
Start: 2018-01-26 | End: 2018-01-30

## 2018-01-26 RX ORDER — IBUPROFEN 200 MG
600 TABLET ORAL EVERY 6 HOURS
Qty: 0 | Refills: 0 | Status: DISCONTINUED | OUTPATIENT
Start: 2018-01-26 | End: 2018-01-30

## 2018-01-26 RX ORDER — IBUPROFEN 200 MG
600 TABLET ORAL EVERY 6 HOURS
Qty: 0 | Refills: 0 | Status: DISCONTINUED | OUTPATIENT
Start: 2018-01-26 | End: 2018-01-26

## 2018-01-26 RX ORDER — DIVALPROEX SODIUM 500 MG/1
500 TABLET, DELAYED RELEASE ORAL
Qty: 0 | Refills: 0 | Status: DISCONTINUED | OUTPATIENT
Start: 2018-01-26 | End: 2018-01-30

## 2018-01-26 RX ORDER — PANTOPRAZOLE SODIUM 20 MG/1
40 TABLET, DELAYED RELEASE ORAL
Qty: 0 | Refills: 0 | Status: DISCONTINUED | OUTPATIENT
Start: 2018-01-26 | End: 2018-01-30

## 2018-01-26 RX ORDER — DIPHENHYDRAMINE HCL 50 MG
50 CAPSULE ORAL EVERY 6 HOURS
Qty: 0 | Refills: 0 | Status: DISCONTINUED | OUTPATIENT
Start: 2018-01-26 | End: 2018-01-30

## 2018-01-26 RX ADMIN — Medication 2 MILLIGRAM(S): at 03:46

## 2018-01-26 RX ADMIN — Medication 600 MILLIGRAM(S): at 21:27

## 2018-01-26 RX ADMIN — Medication 650 MILLIGRAM(S): at 18:26

## 2018-01-26 RX ADMIN — Medication 600 MILLIGRAM(S): at 04:12

## 2018-01-26 RX ADMIN — DIVALPROEX SODIUM 500 MILLIGRAM(S): 500 TABLET, DELAYED RELEASE ORAL at 20:36

## 2018-01-26 RX ADMIN — Medication 600 MILLIGRAM(S): at 15:26

## 2018-01-26 RX ADMIN — PANTOPRAZOLE SODIUM 40 MILLIGRAM(S): 20 TABLET, DELAYED RELEASE ORAL at 09:43

## 2018-01-26 RX ADMIN — Medication 600 MILLIGRAM(S): at 03:46

## 2018-01-26 NOTE — CHART NOTE - NSCHARTNOTEFT_GEN_A_CORE
Screening Medical Evaluation  Patient Admitted from: [facility] ED    [facility] admitting diagnosis:  Bipolar affective disorder  PAST MEDICAL & SURGICAL HISTORY:  Pneumonia: 2016  Chest pain: cardiac work up diagnosed GERD   Anal fissure  GERD (gastroesophageal reflux disease): resolved, on no med  S/P appendectomy        Allergies    Reglan (Other)  Zofran (Other)    Intolerances        Social History:     FAMILY HISTORY:  No pertinent family history in first degree relatives      MEDICATIONS  (STANDING):  diVALproex  milliGRAM(s) Oral two times a day  naproxen 500 milliGRAM(s) Oral once  pantoprazole    Tablet 40 milliGRAM(s) Oral before breakfast    MEDICATIONS  (PRN):  diphenhydrAMINE   Capsule 50 milliGRAM(s) Oral every 6 hours PRN agitation/anxiety/insomnia/allergic reaction  diphenhydrAMINE   Injectable 50 milliGRAM(s) IntraMuscular once PRN agitation  ibuprofen  Tablet 600 milliGRAM(s) Oral every 6 hours PRN pain  LORazepam     Tablet 2 milliGRAM(s) Oral every 6 hours PRN severe anxiety/agitation  LORazepam   Injectable 2 milliGRAM(s) IntraMuscular once PRN Agitation      Vital Signs Last 24 Hrs  T(C): 36.7 (2018 00:53), Max: 36.7 (2018 00:53)  T(F): 98.1 (2018 00:53), Max: 98.1 (2018 00:53)  HR: 72  BP: 142/75  RR: --  SpO2: --  CAPILLARY BLOOD GLUCOSE            PHYSICAL EXAM:  GENERAL: NAD, well-developed  HEAD:  Atraumatic, Normocephalic  EYES: EOMI, PERRLA, conjunctiva and sclera clear  NECK: Supple.  CHEST/LUNG: Clear to auscultation bilaterally; No wheezes  HEART: Regular rate and rhythm; +s1 and +s2; No murmurs, rubs, or gallops  ABDOMEN: Soft, Nontender, Nondistended; Bowel sounds present  EXTREMITIES:  2+ Peripheral Pulses, No cyanosis, or edema  PSYCH: AAOx3  NEUROLOGY: CN 2-12 intact.  SKIN: No rashes or lesions    LABS:                        15.7   9.9   )-----------( 264      ( 2018 16:30 )             44.3         142  |  102  |  8   ----------------------------<  99  3.6   |  23  |  1.03    Ca    9.8      2018 16:30    TPro  8.4<H>  /  Alb  4.8  /  TBili  1.2  /  DBili  x   /  AST  22  /  ALT  18  /  AlkPhos  54            Urinalysis Basic - ( 2018 16:30 )    Color: Yellow / Appearance: Clear / S.022 / pH: x  Gluc: x / Ketone: Moderate  / Bili: Negative / Urobili: Negative   Blood: x / Protein: 30 mg/dL / Nitrite: Negative   Leuk Esterase: Negative / RBC: 0-2 /HPF / WBC 3-5 /HPF   Sq Epi: x / Non Sq Epi: x / Bacteria: Few /HPF        RADIOLOGY & ADDITIONAL TESTS:    Assessment and Plan:  27 year old male presenting today from [facility] ED to [facility] with admitting diagnosis of  Bipolar affective disorder with PMH of s/p appendectomy, and GERD. Pt currently reports abdominal pain that has been chronic since his appendectomy. Pt states that once a month he receives nerve block on abdomen for pain and is due for the nerve block this month. Pt states the pain causes him trouble sleeping. Pt was given warm compress and naproxen for pain. Denies any fever, chills, headache, SOB, chest pain, N/V/D/C.  1) GERD: Continue protonix 40 mg before breakfast daily.  2)  Bipolar affective disorder: Follow care plan as per primary psych team.

## 2018-01-27 PROCEDURE — 99222 1ST HOSP IP/OBS MODERATE 55: CPT

## 2018-01-27 RX ORDER — ACETAMINOPHEN 500 MG
650 TABLET ORAL ONCE
Qty: 0 | Refills: 0 | Status: COMPLETED | OUTPATIENT
Start: 2018-01-27 | End: 2018-01-27

## 2018-01-27 RX ADMIN — Medication 600 MILLIGRAM(S): at 20:15

## 2018-01-27 RX ADMIN — DIVALPROEX SODIUM 500 MILLIGRAM(S): 500 TABLET, DELAYED RELEASE ORAL at 20:14

## 2018-01-27 RX ADMIN — Medication 600 MILLIGRAM(S): at 15:08

## 2018-01-27 RX ADMIN — DIVALPROEX SODIUM 500 MILLIGRAM(S): 500 TABLET, DELAYED RELEASE ORAL at 09:03

## 2018-01-27 RX ADMIN — Medication 2 MILLIGRAM(S): at 21:27

## 2018-01-27 RX ADMIN — PANTOPRAZOLE SODIUM 40 MILLIGRAM(S): 20 TABLET, DELAYED RELEASE ORAL at 09:03

## 2018-01-27 RX ADMIN — Medication 500 MILLIGRAM(S): at 09:02

## 2018-01-27 RX ADMIN — Medication 650 MILLIGRAM(S): at 21:27

## 2018-01-28 PROCEDURE — 99232 SBSQ HOSP IP/OBS MODERATE 35: CPT

## 2018-01-28 RX ADMIN — Medication 2 MILLIGRAM(S): at 18:29

## 2018-01-28 RX ADMIN — Medication 50 MILLIGRAM(S): at 18:30

## 2018-01-28 RX ADMIN — DIVALPROEX SODIUM 500 MILLIGRAM(S): 500 TABLET, DELAYED RELEASE ORAL at 20:37

## 2018-01-28 RX ADMIN — Medication 600 MILLIGRAM(S): at 17:23

## 2018-01-28 RX ADMIN — PANTOPRAZOLE SODIUM 40 MILLIGRAM(S): 20 TABLET, DELAYED RELEASE ORAL at 08:54

## 2018-01-28 RX ADMIN — DIVALPROEX SODIUM 500 MILLIGRAM(S): 500 TABLET, DELAYED RELEASE ORAL at 08:54

## 2018-01-29 PROCEDURE — 99232 SBSQ HOSP IP/OBS MODERATE 35: CPT

## 2018-01-29 RX ORDER — DIVALPROEX SODIUM 500 MG/1
1 TABLET, DELAYED RELEASE ORAL
Qty: 30 | Refills: 0 | OUTPATIENT
Start: 2018-01-29 | End: 2018-02-12

## 2018-01-29 RX ORDER — PANTOPRAZOLE SODIUM 20 MG/1
1 TABLET, DELAYED RELEASE ORAL
Qty: 15 | Refills: 0 | OUTPATIENT
Start: 2018-01-29 | End: 2018-02-12

## 2018-01-29 RX ADMIN — DIVALPROEX SODIUM 500 MILLIGRAM(S): 500 TABLET, DELAYED RELEASE ORAL at 21:38

## 2018-01-29 RX ADMIN — DIVALPROEX SODIUM 500 MILLIGRAM(S): 500 TABLET, DELAYED RELEASE ORAL at 08:28

## 2018-01-29 RX ADMIN — PANTOPRAZOLE SODIUM 40 MILLIGRAM(S): 20 TABLET, DELAYED RELEASE ORAL at 08:28

## 2018-01-29 RX ADMIN — Medication 600 MILLIGRAM(S): at 18:06

## 2018-01-30 VITALS — TEMPERATURE: 98 F

## 2018-01-30 LAB — VALPROATE SERPL-MCNC: 81.2 UG/ML — SIGNIFICANT CHANGE UP (ref 50–100)

## 2018-01-30 PROCEDURE — 99238 HOSP IP/OBS DSCHRG MGMT 30/<: CPT

## 2018-01-30 RX ADMIN — DIVALPROEX SODIUM 500 MILLIGRAM(S): 500 TABLET, DELAYED RELEASE ORAL at 08:23

## 2018-01-30 RX ADMIN — PANTOPRAZOLE SODIUM 40 MILLIGRAM(S): 20 TABLET, DELAYED RELEASE ORAL at 08:23

## 2018-02-05 ENCOUNTER — OUTPATIENT (OUTPATIENT)
Dept: OUTPATIENT SERVICES | Facility: HOSPITAL | Age: 28
LOS: 1 days | Discharge: STILL ADMITTED OR ADMINISTRATIVE | End: 2018-02-05

## 2018-02-05 DIAGNOSIS — Z90.49 ACQUIRED ABSENCE OF OTHER SPECIFIED PARTS OF DIGESTIVE TRACT: Chronic | ICD-10-CM

## 2018-02-06 DIAGNOSIS — F30.10 MANIC EPISODE WITHOUT PSYCHOTIC SYMPTOMS, UNSPECIFIED: ICD-10-CM

## 2018-02-22 ENCOUNTER — APPOINTMENT (OUTPATIENT)
Dept: PSYCHIATRY | Facility: CLINIC | Age: 28
End: 2018-02-22

## 2018-03-28 ENCOUNTER — APPOINTMENT (OUTPATIENT)
Dept: INTERNAL MEDICINE | Facility: CLINIC | Age: 28
End: 2018-03-28

## 2018-03-30 ENCOUNTER — APPOINTMENT (OUTPATIENT)
Dept: INTERNAL MEDICINE | Facility: CLINIC | Age: 28
End: 2018-03-30
Payer: COMMERCIAL

## 2018-03-30 VITALS
DIASTOLIC BLOOD PRESSURE: 70 MMHG | OXYGEN SATURATION: 98 % | SYSTOLIC BLOOD PRESSURE: 118 MMHG | HEIGHT: 72 IN | HEART RATE: 88 BPM | WEIGHT: 165 LBS | BODY MASS INDEX: 22.35 KG/M2

## 2018-03-30 PROCEDURE — 36415 COLL VENOUS BLD VENIPUNCTURE: CPT

## 2018-03-30 PROCEDURE — 99213 OFFICE O/P EST LOW 20 MIN: CPT | Mod: 25

## 2018-04-02 LAB
ALBUMIN SERPL ELPH-MCNC: 4.1 G/DL
ALP BLD-CCNC: 54 U/L
ALT SERPL-CCNC: 22 U/L
ANION GAP SERPL CALC-SCNC: 14 MMOL/L
APPEARANCE: CLEAR
AST SERPL-CCNC: 21 U/L
BACTERIA: NEGATIVE
BASOPHILS # BLD AUTO: 0.03 K/UL
BASOPHILS NFR BLD AUTO: 0.5 %
BILIRUB SERPL-MCNC: 0.5 MG/DL
BILIRUBIN URINE: NEGATIVE
BLOOD URINE: NEGATIVE
BUN SERPL-MCNC: 10 MG/DL
CALCIUM SERPL-MCNC: 9.7 MG/DL
CHLORIDE SERPL-SCNC: 104 MMOL/L
CHOLEST SERPL-MCNC: 158 MG/DL
CHOLEST/HDLC SERPL: 2.5 RATIO
CO2 SERPL-SCNC: 23 MMOL/L
COLOR: YELLOW
CREAT SERPL-MCNC: 0.95 MG/DL
EOSINOPHIL # BLD AUTO: 0.12 K/UL
EOSINOPHIL NFR BLD AUTO: 2.2 %
GLUCOSE QUALITATIVE U: NEGATIVE MG/DL
GLUCOSE SERPL-MCNC: 93 MG/DL
HBA1C MFR BLD HPLC: 5.2 %
HCT VFR BLD CALC: 46 %
HDLC SERPL-MCNC: 63 MG/DL
HGB BLD-MCNC: 15.3 G/DL
HYALINE CASTS: 0 /LPF
IMM GRANULOCYTES NFR BLD AUTO: 0 %
KETONES URINE: NEGATIVE
LDLC SERPL CALC-MCNC: 85 MG/DL
LEUKOCYTE ESTERASE URINE: NEGATIVE
LYMPHOCYTES # BLD AUTO: 1.71 K/UL
LYMPHOCYTES NFR BLD AUTO: 30.7 %
MAN DIFF?: NORMAL
MCHC RBC-ENTMCNC: 27.5 PG
MCHC RBC-ENTMCNC: 33.3 GM/DL
MCV RBC AUTO: 82.7 FL
MICROSCOPIC-UA: NORMAL
MONOCYTES # BLD AUTO: 0.33 K/UL
MONOCYTES NFR BLD AUTO: 5.9 %
NEUTROPHILS # BLD AUTO: 3.38 K/UL
NEUTROPHILS NFR BLD AUTO: 60.7 %
NITRITE URINE: NEGATIVE
PH URINE: 7
PLATELET # BLD AUTO: 225 K/UL
POTASSIUM SERPL-SCNC: 4.5 MMOL/L
PROT SERPL-MCNC: 7.8 G/DL
PROTEIN URINE: NEGATIVE MG/DL
RBC # BLD: 5.56 M/UL
RBC # FLD: 13.7 %
RED BLOOD CELLS URINE: 1 /HPF
SODIUM SERPL-SCNC: 141 MMOL/L
SPECIFIC GRAVITY URINE: 1.01
SQUAMOUS EPITHELIAL CELLS: 0 /HPF
TRIGL SERPL-MCNC: 50 MG/DL
TSH SERPL-ACNC: 0.65 UIU/ML
UROBILINOGEN URINE: NEGATIVE MG/DL
WBC # FLD AUTO: 5.57 K/UL
WHITE BLOOD CELLS URINE: 0 /HPF

## 2018-06-23 ENCOUNTER — INPATIENT (INPATIENT)
Facility: HOSPITAL | Age: 28
LOS: 2 days | Discharge: HOME | End: 2018-06-26
Attending: INTERNAL MEDICINE | Admitting: INTERNAL MEDICINE
Payer: COMMERCIAL

## 2018-06-23 VITALS
TEMPERATURE: 98 F | HEART RATE: 68 BPM | SYSTOLIC BLOOD PRESSURE: 111 MMHG | RESPIRATION RATE: 18 BRPM | OXYGEN SATURATION: 98 % | DIASTOLIC BLOOD PRESSURE: 72 MMHG

## 2018-06-23 DIAGNOSIS — Z90.49 ACQUIRED ABSENCE OF OTHER SPECIFIED PARTS OF DIGESTIVE TRACT: Chronic | ICD-10-CM

## 2018-06-23 LAB
BASE EXCESS BLDV CALC-SCNC: -0.5 MMOL/L — SIGNIFICANT CHANGE UP
BASOPHILS # BLD AUTO: 0.07 K/UL — SIGNIFICANT CHANGE UP (ref 0–0.2)
BASOPHILS NFR BLD AUTO: 0.4 % — SIGNIFICANT CHANGE UP (ref 0–2)
BLOOD GAS VENOUS - CREATININE: 1.05 MG/DL — SIGNIFICANT CHANGE UP (ref 0.5–1.3)
CHLORIDE BLDV-SCNC: 107 MMOL/L — SIGNIFICANT CHANGE UP (ref 96–108)
EOSINOPHIL # BLD AUTO: 0.01 K/UL — SIGNIFICANT CHANGE UP (ref 0–0.5)
EOSINOPHIL NFR BLD AUTO: 0.1 % — SIGNIFICANT CHANGE UP (ref 0–6)
GAS PNL BLDV: 142 MMOL/L — SIGNIFICANT CHANGE UP (ref 136–146)
GLUCOSE BLDV-MCNC: 121 — HIGH (ref 70–99)
HCO3 BLDV-SCNC: 23 MMOL/L — SIGNIFICANT CHANGE UP (ref 20–27)
HCT VFR BLD CALC: 44.6 % — SIGNIFICANT CHANGE UP (ref 39–50)
HCT VFR BLDV CALC: 49 % — SIGNIFICANT CHANGE UP (ref 39–51)
HGB BLD-MCNC: 15.2 G/DL — SIGNIFICANT CHANGE UP (ref 13–17)
HGB BLDV-MCNC: 16 G/DL — SIGNIFICANT CHANGE UP (ref 13–17)
IMM GRANULOCYTES # BLD AUTO: 0.06 # — SIGNIFICANT CHANGE UP
IMM GRANULOCYTES NFR BLD AUTO: 0.4 % — SIGNIFICANT CHANGE UP (ref 0–1.5)
LACTATE BLDV-MCNC: 2.5 MMOL/L — HIGH (ref 0.5–2)
LYMPHOCYTES # BLD AUTO: 1.34 K/UL — SIGNIFICANT CHANGE UP (ref 1–3.3)
LYMPHOCYTES # BLD AUTO: 8.4 % — LOW (ref 13–44)
MCHC RBC-ENTMCNC: 26.7 PG — LOW (ref 27–34)
MCHC RBC-ENTMCNC: 34.1 % — SIGNIFICANT CHANGE UP (ref 32–36)
MCV RBC AUTO: 78.2 FL — LOW (ref 80–100)
MONOCYTES # BLD AUTO: 1.03 K/UL — HIGH (ref 0–0.9)
MONOCYTES NFR BLD AUTO: 6.5 % — SIGNIFICANT CHANGE UP (ref 2–14)
NEUTROPHILS # BLD AUTO: 13.43 K/UL — HIGH (ref 1.8–7.4)
NEUTROPHILS NFR BLD AUTO: 84.2 % — HIGH (ref 43–77)
NRBC # FLD: 0 — SIGNIFICANT CHANGE UP
PCO2 BLDV: 34 MMHG — LOW (ref 41–51)
PH BLDV: 7.44 PH — HIGH (ref 7.32–7.43)
PLATELET # BLD AUTO: 237 K/UL — SIGNIFICANT CHANGE UP (ref 150–400)
PMV BLD: 11.3 FL — SIGNIFICANT CHANGE UP (ref 7–13)
PO2 BLDV: 26 MMHG — LOW (ref 35–40)
POTASSIUM BLDV-SCNC: 3.5 MMOL/L — SIGNIFICANT CHANGE UP (ref 3.4–4.5)
RBC # BLD: 5.7 M/UL — SIGNIFICANT CHANGE UP (ref 4.2–5.8)
RBC # FLD: 12.9 % — SIGNIFICANT CHANGE UP (ref 10.3–14.5)
SAO2 % BLDV: 50.3 % — LOW (ref 60–85)
WBC # BLD: 15.94 K/UL — HIGH (ref 3.8–10.5)
WBC # FLD AUTO: 15.94 K/UL — HIGH (ref 3.8–10.5)

## 2018-06-23 RX ORDER — PROCHLORPERAZINE MALEATE 5 MG
10 TABLET ORAL ONCE
Qty: 0 | Refills: 0 | Status: COMPLETED | OUTPATIENT
Start: 2018-06-23 | End: 2018-06-23

## 2018-06-23 RX ORDER — SODIUM CHLORIDE 9 MG/ML
1000 INJECTION INTRAMUSCULAR; INTRAVENOUS; SUBCUTANEOUS ONCE
Qty: 0 | Refills: 0 | Status: COMPLETED | OUTPATIENT
Start: 2018-06-23 | End: 2018-06-23

## 2018-06-23 RX ORDER — HALOPERIDOL DECANOATE 100 MG/ML
2.5 INJECTION INTRAMUSCULAR ONCE
Qty: 0 | Refills: 0 | Status: COMPLETED | OUTPATIENT
Start: 2018-06-23 | End: 2018-06-23

## 2018-06-23 RX ORDER — MORPHINE SULFATE 50 MG/1
4 CAPSULE, EXTENDED RELEASE ORAL ONCE
Qty: 0 | Refills: 0 | Status: DISCONTINUED | OUTPATIENT
Start: 2018-06-23 | End: 2018-06-23

## 2018-06-23 RX ADMIN — Medication 10 MILLIGRAM(S): at 23:21

## 2018-06-23 RX ADMIN — HALOPERIDOL DECANOATE 2.5 MILLIGRAM(S): 100 INJECTION INTRAMUSCULAR at 23:57

## 2018-06-23 RX ADMIN — MORPHINE SULFATE 4 MILLIGRAM(S): 50 CAPSULE, EXTENDED RELEASE ORAL at 23:21

## 2018-06-23 RX ADMIN — SODIUM CHLORIDE 1000 MILLILITER(S): 9 INJECTION INTRAMUSCULAR; INTRAVENOUS; SUBCUTANEOUS at 23:21

## 2018-06-23 RX ADMIN — Medication 2 MILLIGRAM(S): at 23:57

## 2018-06-23 NOTE — ED PROVIDER NOTE - GASTROINTESTINAL, MLM
Pt with voluntary guarding and screaming when abdomen is approached; however pt is distractible. After medication pt with soft benign abdomen

## 2018-06-23 NOTE — ED PROVIDER NOTE - PROGRESS NOTE DETAILS
[place] PGY4: Still nauseated with vomiting. CT no major abnormality, possible colitis. Accepted A team NATASHA Sawyer text page sent

## 2018-06-23 NOTE — ED PROVIDER NOTE - CARE PLAN
Principal Discharge DX:	Vomiting  Secondary Diagnosis:	Abdominal pain  Secondary Diagnosis:	Chronic pain

## 2018-06-23 NOTE — ED ADULT TRIAGE NOTE - CHIEF COMPLAINT QUOTE
C/o abdominal pain, nausea and vomiting for 45 minutes. Hx of appendic removal and nerve pain. Pt is uncomfortable in triage.

## 2018-06-23 NOTE — ED ADULT NURSE NOTE - OBJECTIVE STATEMENT
Appears weak, in distressed with multiple vomiting episodes.  IV accessed. Labs sent.  NS infusing bolus.  All medication given as ordered.  Handoff given to primary RN.

## 2018-06-23 NOTE — ED PROVIDER NOTE - CONSTITUTIONAL APPEARANCE HYGIENE, MLM
well appearing/pt writhing on bed, intermittently wailing, not making eye contact, intermittently retching with exaggerated movements

## 2018-06-23 NOTE — ED PROVIDER NOTE - MEDICAL DECISION MAKING DETAILS
Jaren: Bipolar, s/p appy f/b chronic abd pain w/o adhesions on f/u laparscopy looking for them. P/w vomiting and abd pain. Plan: labs, nausea and vomiting meds.

## 2018-06-23 NOTE — ED ADULT NURSE NOTE - CHIEF COMPLAINT
(Coverage RN [phone]) The patient is a 27y Male complaining of severe abd.pain with nausea, vomiting x 2 hrs.  Pt can not recall triggers.  Pt apprehensive, irritible, moaning in pain.  Pmh of "nerve pain" on the stomach, s/p appendectomy in 2004.

## 2018-06-23 NOTE — ED PROVIDER NOTE - ATTENDING CONTRIBUTION TO CARE
I performed a face-to-face evaluation of the patient and performed a history and physical examination. I agree with the history and physical examination.    Jaren: Bipolar, s/p appy f/b chronic abd pain w/o adhesions on f/u laparscopy looking for them. P/w vomiting and abd pain. Plan: labs, nausea and vomiting meds.

## 2018-06-24 DIAGNOSIS — K21.9 GASTRO-ESOPHAGEAL REFLUX DISEASE WITHOUT ESOPHAGITIS: ICD-10-CM

## 2018-06-24 DIAGNOSIS — R10.9 UNSPECIFIED ABDOMINAL PAIN: ICD-10-CM

## 2018-06-24 DIAGNOSIS — R11.10 VOMITING, UNSPECIFIED: ICD-10-CM

## 2018-06-24 LAB
ALBUMIN SERPL ELPH-MCNC: 4.7 G/DL — SIGNIFICANT CHANGE UP (ref 3.3–5)
ALP SERPL-CCNC: 48 U/L — SIGNIFICANT CHANGE UP (ref 40–120)
ALT FLD-CCNC: 16 U/L — SIGNIFICANT CHANGE UP (ref 4–41)
AMPHET UR-MCNC: NEGATIVE — SIGNIFICANT CHANGE UP
APPEARANCE UR: CLEAR — SIGNIFICANT CHANGE UP
AST SERPL-CCNC: 18 U/L — SIGNIFICANT CHANGE UP (ref 4–40)
BARBITURATES UR SCN-MCNC: NEGATIVE — SIGNIFICANT CHANGE UP
BENZODIAZ UR-MCNC: NEGATIVE — SIGNIFICANT CHANGE UP
BILIRUB SERPL-MCNC: 0.8 MG/DL — SIGNIFICANT CHANGE UP (ref 0.2–1.2)
BILIRUB UR-MCNC: NEGATIVE — SIGNIFICANT CHANGE UP
BLOOD UR QL VISUAL: NEGATIVE — SIGNIFICANT CHANGE UP
BUN SERPL-MCNC: 12 MG/DL — SIGNIFICANT CHANGE UP (ref 7–23)
CALCIUM SERPL-MCNC: 9.6 MG/DL — SIGNIFICANT CHANGE UP (ref 8.4–10.5)
CANNABINOIDS UR-MCNC: POSITIVE — SIGNIFICANT CHANGE UP
CHLORIDE SERPL-SCNC: 102 MMOL/L — SIGNIFICANT CHANGE UP (ref 98–107)
CO2 SERPL-SCNC: 22 MMOL/L — SIGNIFICANT CHANGE UP (ref 22–31)
COCAINE METAB.OTHER UR-MCNC: NEGATIVE — SIGNIFICANT CHANGE UP
COLOR SPEC: SIGNIFICANT CHANGE UP
CREAT SERPL-MCNC: 1.04 MG/DL — SIGNIFICANT CHANGE UP (ref 0.5–1.3)
GLUCOSE SERPL-MCNC: 122 MG/DL — HIGH (ref 70–99)
GLUCOSE UR-MCNC: NEGATIVE — SIGNIFICANT CHANGE UP
KETONES UR-MCNC: NEGATIVE — SIGNIFICANT CHANGE UP
LEUKOCYTE ESTERASE UR-ACNC: NEGATIVE — SIGNIFICANT CHANGE UP
LIDOCAIN IGE QN: 88.1 U/L — HIGH (ref 7–60)
METHADONE UR-MCNC: NEGATIVE — SIGNIFICANT CHANGE UP
MUCOUS THREADS # UR AUTO: SIGNIFICANT CHANGE UP
NITRITE UR-MCNC: NEGATIVE — SIGNIFICANT CHANGE UP
NON-SQ EPI CELLS # UR AUTO: <1 — SIGNIFICANT CHANGE UP
OPIATES UR-MCNC: POSITIVE — SIGNIFICANT CHANGE UP
OXYCODONE UR-MCNC: NEGATIVE — SIGNIFICANT CHANGE UP
PCP UR-MCNC: NEGATIVE — SIGNIFICANT CHANGE UP
PH UR: 7 — SIGNIFICANT CHANGE UP (ref 4.6–8)
POTASSIUM SERPL-MCNC: 3.6 MMOL/L — SIGNIFICANT CHANGE UP (ref 3.5–5.3)
POTASSIUM SERPL-SCNC: 3.6 MMOL/L — SIGNIFICANT CHANGE UP (ref 3.5–5.3)
PROT SERPL-MCNC: 8.1 G/DL — SIGNIFICANT CHANGE UP (ref 6–8.3)
PROT UR-MCNC: NEGATIVE MG/DL — SIGNIFICANT CHANGE UP
RBC CASTS # UR COMP ASSIST: SIGNIFICANT CHANGE UP (ref 0–?)
SODIUM SERPL-SCNC: 141 MMOL/L — SIGNIFICANT CHANGE UP (ref 135–145)
SP GR SPEC: 1.02 — SIGNIFICANT CHANGE UP (ref 1–1.04)
UROBILINOGEN FLD QL: NORMAL MG/DL — SIGNIFICANT CHANGE UP
WBC UR QL: SIGNIFICANT CHANGE UP (ref 0–?)

## 2018-06-24 PROCEDURE — 74177 CT ABD & PELVIS W/CONTRAST: CPT | Mod: 26

## 2018-06-24 RX ORDER — MORPHINE SULFATE 50 MG/1
4 CAPSULE, EXTENDED RELEASE ORAL ONCE
Qty: 0 | Refills: 0 | Status: DISCONTINUED | OUTPATIENT
Start: 2018-06-24 | End: 2018-06-24

## 2018-06-24 RX ORDER — SODIUM CHLORIDE 9 MG/ML
1000 INJECTION INTRAMUSCULAR; INTRAVENOUS; SUBCUTANEOUS ONCE
Qty: 0 | Refills: 0 | Status: COMPLETED | OUTPATIENT
Start: 2018-06-24 | End: 2018-06-24

## 2018-06-24 RX ORDER — SODIUM CHLORIDE 9 MG/ML
1000 INJECTION, SOLUTION INTRAVENOUS
Qty: 0 | Refills: 0 | Status: DISCONTINUED | OUTPATIENT
Start: 2018-06-24 | End: 2018-06-26

## 2018-06-24 RX ORDER — PANTOPRAZOLE SODIUM 20 MG/1
40 TABLET, DELAYED RELEASE ORAL
Qty: 0 | Refills: 0 | Status: DISCONTINUED | OUTPATIENT
Start: 2018-06-24 | End: 2018-06-26

## 2018-06-24 RX ORDER — MORPHINE SULFATE 50 MG/1
2 CAPSULE, EXTENDED RELEASE ORAL EVERY 4 HOURS
Qty: 0 | Refills: 0 | Status: DISCONTINUED | OUTPATIENT
Start: 2018-06-24 | End: 2018-06-26

## 2018-06-24 RX ORDER — FAMOTIDINE 10 MG/ML
20 INJECTION INTRAVENOUS ONCE
Qty: 0 | Refills: 0 | Status: COMPLETED | OUTPATIENT
Start: 2018-06-24 | End: 2018-06-24

## 2018-06-24 RX ORDER — LIDOCAINE 4 G/100G
1 CREAM TOPICAL DAILY
Qty: 0 | Refills: 0 | Status: DISCONTINUED | OUTPATIENT
Start: 2018-06-24 | End: 2018-06-26

## 2018-06-24 RX ORDER — GABAPENTIN 400 MG/1
100 CAPSULE ORAL
Qty: 0 | Refills: 0 | Status: DISCONTINUED | OUTPATIENT
Start: 2018-06-24 | End: 2018-06-26

## 2018-06-24 RX ADMIN — SODIUM CHLORIDE 100 MILLILITER(S): 9 INJECTION, SOLUTION INTRAVENOUS at 09:34

## 2018-06-24 RX ADMIN — GABAPENTIN 100 MILLIGRAM(S): 400 CAPSULE ORAL at 18:29

## 2018-06-24 RX ADMIN — MORPHINE SULFATE 2 MILLIGRAM(S): 50 CAPSULE, EXTENDED RELEASE ORAL at 23:19

## 2018-06-24 RX ADMIN — PANTOPRAZOLE SODIUM 40 MILLIGRAM(S): 20 TABLET, DELAYED RELEASE ORAL at 18:29

## 2018-06-24 RX ADMIN — SODIUM CHLORIDE 100 MILLILITER(S): 9 INJECTION, SOLUTION INTRAVENOUS at 23:09

## 2018-06-24 RX ADMIN — LIDOCAINE 1 PATCH: 4 CREAM TOPICAL at 18:29

## 2018-06-24 RX ADMIN — SODIUM CHLORIDE 1000 MILLILITER(S): 9 INJECTION INTRAMUSCULAR; INTRAVENOUS; SUBCUTANEOUS at 02:23

## 2018-06-24 RX ADMIN — FAMOTIDINE 20 MILLIGRAM(S): 10 INJECTION INTRAVENOUS at 03:42

## 2018-06-24 RX ADMIN — MORPHINE SULFATE 2 MILLIGRAM(S): 50 CAPSULE, EXTENDED RELEASE ORAL at 23:50

## 2018-06-24 NOTE — H&P ADULT - RS GEN PE MLT RESP DETAILS PC
no chest wall tenderness/normal/clear to auscultation bilaterally/no intercostal retractions/respirations non-labored/breath sounds equal/good air movement/no rhonchi/no rales/airway patent

## 2018-06-24 NOTE — H&P ADULT - NEGATIVE CARDIOVASCULAR SYMPTOMS
no paroxysmal nocturnal dyspnea/no claudication/no palpitations/no chest pain/no peripheral edema/no orthopnea/no dyspnea on exertion

## 2018-06-24 NOTE — H&P ADULT - HISTORY OF PRESENT ILLNESS
27M with chronic RLQ abd pain after appendectomy over a decade ago p/w abd pain and retching. Pt's family reports sx started around 1945 with worsening pain, multiple episodes of vomiting stomach contents. Has had pain before but the nausea is new. Chart review shows multiple previous ED visits for abd pain and vomiting. No fever, diarrhea, CP, SOB. Follows with pain management.

## 2018-06-25 ENCOUNTER — RESULT REVIEW (OUTPATIENT)
Age: 28
End: 2018-06-25

## 2018-06-25 LAB
ALBUMIN SERPL ELPH-MCNC: 3.7 G/DL — SIGNIFICANT CHANGE UP (ref 3.3–5)
ALP SERPL-CCNC: 34 U/L — LOW (ref 40–120)
ALT FLD-CCNC: 12 U/L — SIGNIFICANT CHANGE UP (ref 4–41)
AST SERPL-CCNC: 20 U/L — SIGNIFICANT CHANGE UP (ref 4–40)
BILIRUB SERPL-MCNC: 0.7 MG/DL — SIGNIFICANT CHANGE UP (ref 0.2–1.2)
BUN SERPL-MCNC: 5 MG/DL — LOW (ref 7–23)
CALCIUM SERPL-MCNC: 8.3 MG/DL — LOW (ref 8.4–10.5)
CHLORIDE SERPL-SCNC: 108 MMOL/L — HIGH (ref 98–107)
CO2 SERPL-SCNC: 23 MMOL/L — SIGNIFICANT CHANGE UP (ref 22–31)
CREAT SERPL-MCNC: 0.84 MG/DL — SIGNIFICANT CHANGE UP (ref 0.5–1.3)
GLUCOSE SERPL-MCNC: 98 MG/DL — SIGNIFICANT CHANGE UP (ref 70–99)
HCT VFR BLD CALC: 39.9 % — SIGNIFICANT CHANGE UP (ref 39–50)
HGB BLD-MCNC: 13.5 G/DL — SIGNIFICANT CHANGE UP (ref 13–17)
MCHC RBC-ENTMCNC: 27.2 PG — SIGNIFICANT CHANGE UP (ref 27–34)
MCHC RBC-ENTMCNC: 33.8 % — SIGNIFICANT CHANGE UP (ref 32–36)
MCV RBC AUTO: 80.3 FL — SIGNIFICANT CHANGE UP (ref 80–100)
NRBC # FLD: 0 — SIGNIFICANT CHANGE UP
PLATELET # BLD AUTO: 179 K/UL — SIGNIFICANT CHANGE UP (ref 150–400)
PMV BLD: 11.1 FL — SIGNIFICANT CHANGE UP (ref 7–13)
POTASSIUM SERPL-MCNC: 3.6 MMOL/L — SIGNIFICANT CHANGE UP (ref 3.5–5.3)
POTASSIUM SERPL-SCNC: 3.6 MMOL/L — SIGNIFICANT CHANGE UP (ref 3.5–5.3)
PROT SERPL-MCNC: 6.3 G/DL — SIGNIFICANT CHANGE UP (ref 6–8.3)
RBC # BLD: 4.97 M/UL — SIGNIFICANT CHANGE UP (ref 4.2–5.8)
RBC # FLD: 13.2 % — SIGNIFICANT CHANGE UP (ref 10.3–14.5)
SODIUM SERPL-SCNC: 142 MMOL/L — SIGNIFICANT CHANGE UP (ref 135–145)
WBC # BLD: 6.6 K/UL — SIGNIFICANT CHANGE UP (ref 3.8–10.5)
WBC # FLD AUTO: 6.6 K/UL — SIGNIFICANT CHANGE UP (ref 3.8–10.5)

## 2018-06-25 PROCEDURE — 88305 TISSUE EXAM BY PATHOLOGIST: CPT | Mod: 26

## 2018-06-25 PROCEDURE — 88312 SPECIAL STAINS GROUP 1: CPT | Mod: 26

## 2018-06-25 PROCEDURE — 99253 IP/OBS CNSLTJ NEW/EST LOW 45: CPT | Mod: 25,GC

## 2018-06-25 PROCEDURE — 43239 EGD BIOPSY SINGLE/MULTIPLE: CPT | Mod: GC

## 2018-06-25 RX ADMIN — MORPHINE SULFATE 2 MILLIGRAM(S): 50 CAPSULE, EXTENDED RELEASE ORAL at 20:20

## 2018-06-25 RX ADMIN — PANTOPRAZOLE SODIUM 40 MILLIGRAM(S): 20 TABLET, DELAYED RELEASE ORAL at 06:08

## 2018-06-25 RX ADMIN — MORPHINE SULFATE 2 MILLIGRAM(S): 50 CAPSULE, EXTENDED RELEASE ORAL at 06:45

## 2018-06-25 RX ADMIN — MORPHINE SULFATE 2 MILLIGRAM(S): 50 CAPSULE, EXTENDED RELEASE ORAL at 14:13

## 2018-06-25 RX ADMIN — SODIUM CHLORIDE 100 MILLILITER(S): 9 INJECTION, SOLUTION INTRAVENOUS at 14:13

## 2018-06-25 RX ADMIN — SODIUM CHLORIDE 100 MILLILITER(S): 9 INJECTION, SOLUTION INTRAVENOUS at 12:04

## 2018-06-25 RX ADMIN — GABAPENTIN 100 MILLIGRAM(S): 400 CAPSULE ORAL at 06:08

## 2018-06-25 RX ADMIN — LIDOCAINE 1 PATCH: 4 CREAM TOPICAL at 06:07

## 2018-06-25 RX ADMIN — MORPHINE SULFATE 2 MILLIGRAM(S): 50 CAPSULE, EXTENDED RELEASE ORAL at 22:54

## 2018-06-25 RX ADMIN — MORPHINE SULFATE 2 MILLIGRAM(S): 50 CAPSULE, EXTENDED RELEASE ORAL at 10:54

## 2018-06-25 RX ADMIN — MORPHINE SULFATE 2 MILLIGRAM(S): 50 CAPSULE, EXTENDED RELEASE ORAL at 10:30

## 2018-06-25 RX ADMIN — PANTOPRAZOLE SODIUM 40 MILLIGRAM(S): 20 TABLET, DELAYED RELEASE ORAL at 18:49

## 2018-06-25 RX ADMIN — MORPHINE SULFATE 2 MILLIGRAM(S): 50 CAPSULE, EXTENDED RELEASE ORAL at 23:24

## 2018-06-25 RX ADMIN — MORPHINE SULFATE 2 MILLIGRAM(S): 50 CAPSULE, EXTENDED RELEASE ORAL at 14:30

## 2018-06-25 RX ADMIN — MORPHINE SULFATE 2 MILLIGRAM(S): 50 CAPSULE, EXTENDED RELEASE ORAL at 18:47

## 2018-06-25 RX ADMIN — MORPHINE SULFATE 2 MILLIGRAM(S): 50 CAPSULE, EXTENDED RELEASE ORAL at 06:13

## 2018-06-25 RX ADMIN — GABAPENTIN 100 MILLIGRAM(S): 400 CAPSULE ORAL at 18:49

## 2018-06-25 RX ADMIN — LIDOCAINE 1 PATCH: 4 CREAM TOPICAL at 12:03

## 2018-06-25 NOTE — CONSULT NOTE ADULT - ASSESSMENT
Impression:    Abdominal pain:-  Differential dx includes Cannabinoid Hyperemesis Syndrome (Given UDS positive for Cannabinoids and acute onset of persistent vomiting- Cannabinoid hyperemesis syndrome seems most likely), Acute gastroenteritis, IBS (Given history of chronic abdominal pain without organic etiology. Extensive workup including EGD, colonoscopy and diagnostic laparoscopy has been negative. However patient does not report improvement of symptoms with bowel movements)  - Celiac disease, chronic pancreatitis, hypothyroidism, cholecystitis or functional abdominal pain syndrome cannot be ruled out    Recommendations:  - Zofran PRN  - Advance diet as tolerated  - Stool studies to rule out infectious etiology if patient has a BM  - Stool laco Impression:    Abdominal pain:-  Differential dx includes Cannabinoid Hyperemesis Syndrome (Most likely given UDS positive for Cannabinoids and acute onset of persistent vomiting), Acute gastroenteritis, IBS (Given history of chronic abdominal pain without organic etiology. Extensive workup including EGD, colonoscopy and diagnostic laparoscopy has been negative. However less likely since the patient does not report pain associated with changes in bowel habit)  - Celiac disease, chronic pancreatitis, hypothyroidism or functional abdominal pain syndrome cannot be ruled out  - Cholecystitis is less likely. No c/o RUQ abdominal pain, Bass's negative, LFTs- wnl, no radiopaque gallstones seen on CT, no fever, no leukocytosis.  However if symptoms persist abdominal US maybe considered.     Recommendations:  - Zofran PRN  - Advance diet as tolerated  - Stool studies to rule out infectious etiology if patient has a bowel movement  - Stool lactoferrin, stool calprotectin  - TSH (family hx of hypothyroidism, also patient c/o constipation)  - lipase and amylase   - Iron studies (iron deficiency maybe suggestive of malabsorption 2/2 to celiac, IBD, malignancy)  - anti- tissue transglutaminase (given hx of multiple food intolerance, chronic abdominal pain, and ?weight loss)    - Primary team to please obtain results of EGD, Colonoscopy and diagnostic laparoscopy from [facility] Impression:    Abdominal pain:-  Differential dx includes Cannabinoid Hyperemesis Syndrome (Most likely given UDS positive for Cannabinoids and acute onset of persistent vomiting), Acute gastroenteritis, IBS (Given history of chronic abdominal pain without organic etiology. Extensive workup including EGD, colonoscopy and diagnostic laparoscopy has been negative. However less likely since the patient does not report pain associated with changes in bowel habit)  - Celiac disease, chronic pancreatitis, hypothyroidism or functional abdominal pain syndrome cannot be ruled out  - Cholecystitis is less likely. No c/o RUQ abdominal pain, Bass's negative, LFTs- wnl, no radiopaque gallstones seen on CT, no fever, no leukocytosis.  However if symptoms persist abdominal US maybe considered.     Recommendations:  - Zofran PRN  - Keep patient NPO for possible EGD today  - Stool studies to rule out infectious etiology if patient has a bowel movement  - Stool lactoferrin, stool calprotectin  - TSH (family hx of hypothyroidism, also patient c/o constipation)  - lipase and amylase   - Iron studies (iron deficiency maybe suggestive of malabsorption 2/2 to celiac, IBD, malignancy)  - anti- tissue transglutaminase (given hx of multiple food intolerance, chronic abdominal pain, and ?weight loss)    - Primary team to please obtain results of EGD, Colonoscopy and diagnostic laparoscopy. (Colonoscopy by Dr Aquino done at [facility]- [address], [address], [place] [address] T: [phone], EGD done by Dr Wilmer Flores at [address] [place] [id number] T: [phone]). PCP- Dr Puma Ching ([phone]) who also has all the records Impression:    Abdominal pain:-  Differential dx includes Cannabinoid Hyperemesis Syndrome (Most likely given UDS positive for Cannabinoids and opiates with acute onset of persistent vomiting), Acute gastroenteritis, IBS (Given history of chronic abdominal pain without organic etiology. Extensive workup including EGD, colonoscopy and diagnostic laparoscopy has been negative. However less likely since the patient does not report pain associated with changes in bowel habit)  - Celiac disease has been ruled out by serological testing and biopsies, chronic pancreatitis, hypothyroidism or functional abdominal pain syndrome could also be considered  - Cholecystitis is less likely. No c/o RUQ abdominal pain, Bass's negative, LFTs- wnl, no radiopaque gallstones seen on CT, no fever, no leukocytosis.  However if symptoms persist abdominal US maybe considered.     Recommendations:  - Zofran PRN  - Keep patient NPO for possible EGD today  - Stool studies (GI PCR) to rule out infectious etiology if patient has a bowel movement  - TSH (family hx of hypothyroidism, also patient c/o constipation)  - lipase and amylase   - Iron studies (iron deficiency maybe suggestive of malabsorption 2/2 to celiac, IBD, malignancy)  - anti- tissue transglutaminase (given hx of multiple food intolerance, chronic abdominal pain, and ?weight loss)    - Primary team to please obtain results of EGD, Colonoscopy and diagnostic laparoscopy. (Colonoscopy by Dr Aquino done at [facility]- [address], [address], [facility] [address] T: [phone], EGD done by Dr Wilmer Flores at [address] [place] [id number] T: [phone]). PCP- Dr Puma Ching ([phone]) who also has all the records

## 2018-06-25 NOTE — CONSULT NOTE ADULT - ATTENDING COMMENTS
I have seen and evaluated the patient with the GI Fellow and GI Team.  I agree with the findings, formulation and plan of care as documented in the resident's note, except as noted and amended.

## 2018-06-25 NOTE — CHART NOTE - NSCHARTNOTEFT_GEN_A_CORE
Brief GI Note:    EGD performed, full note to follow:    Findings:  - Normal larynx.   - Normal esophagus.   - Z-line regular, 37 cm from the incisors.   - Congestive gastropathy.  Biopsied.   - Normal first part of the duodenum and 2nd part of the duodenum.  Biopsied.   - Biopsies were taken with a cold forceps for Helicobacter pylori testing.     Recommendations:    - Return patient to hospital givens for ongoing care.   - Advance diet as tolerated.   - Await pathology results.   - Avoid Marijuana use.     Please call with questions  María Fung  GI Fellow  Pager: [phone]

## 2018-06-25 NOTE — CONSULT NOTE ADULT - SUBJECTIVE AND OBJECTIVE BOX
HPI:  28 y/o M with PMHX of pancreatitis, bipolar disorder and PSHx of appendectomy in  came in with complaints of RLQ pain and persistent vomiting. Patient states that on Saturday evening  he suddenly started having severe pain and vomiting which at first consisted of the food particles and was later brown in color. The abdominal pain was constant, located in RLQ, radiating to the groin, 10/10 in intensity, and "pressure/squeezing" in nature.  He said he had chronic RLQ pain which is constant and usually 5-6/10. He had detailed workup in the past to assess for the etiology of the pain, but so far the work up has been negative. He denies fever, hematochezia, melena, or diarrhea. He did not have a bowel movement since admission. He also states being constipated last week. Denies recent travel history. Also states being intolerant to all milk products.     Patient was admitted in 2018 for abdominal pain and vomiting. He reports having a dystonic reaction to the medications given. He was again admitted later that month for odd behaviour and was transferred to inpatient Psych at [facility] for diagnosis of bipolar affective disorder.      Due to chronic RLQ pain, patient had colonoscopy in Spring of 2017 by Dr. Connor Aquino . After 1 month, he had diagnostic laparoscopy by Dr Drake to look for possible adhesions/scar. Few months later he had EGD by Dr Wilmer Flores. Per patient, all the workup was done at [facility] and was normal, except for the EGD showing mild gastritis.        PAST MEDICAL & SURGICAL HISTORY:  Pneumonia: 2016  Chest pain: cardiac work up diagnosed GERD   Anal fissure  GERD (gastroesophageal reflux disease): resolved, on no med  S/P appendectomy in   Diagnostic lapatroscopy- May 2017- negative  EGD/Colonoscopy-  showing gastritis      ALLERGIES: Reglan (Other)  Zofran (Other)    SOCIAL HISTORY:   He was born in [place] and immigrated to [place] when he was 1 month old.   He lives with his mother and 3 elder sisters.   He works at property management development.   Denies history of smoking or alcohol use.   Also denies use of illicit drugs.     FAMILY HISTORY:  Hypothyroidism- Mother, sisters, and maternal Aunts  DM- Mother   CAD- paternal uncle  Dementia- Father  Testicular cancer and death at the age of 30- Paternal uncle  Breast cancer- Maternal aunt (Alive and doing well)  Ovarian cyst s/p b/l SBO and RADHA- Mother    No known history of gallstones, H pylori, Gastric ulcer, polyps, IBD, IBS, colon cancer, or gastric cancer.    REVIEW OF SYSTEMS:  CONSTITUTIONAL: No fever, weight loss, or fatigue  EYES: No eye pain, visual disturbances, or discharge  ENMT:  No difficulty hearing, tinnitus, vertigo; No sinus or throat pain  BREASTS: No pain, masses, or nipple discharge  RESPIRATORY: No cough, wheezing, chills or hemoptysis; No shortness of breath  CARDIOVASCULAR: No chest pain, palpitations, dizziness, or leg swelling  GASTROINTESTINAL: RLQ pain, and vomiting  GENITOURINARY: No dysuria, frequency, hematuria, or incontinence  NEUROLOGICAL: No headaches, memory loss, loss of strength, numbness, or tremors  ENDOCRINE: No heat or cold intolerance; No hair loss  MUSCULOSKELETAL: No joint pain or swelling; No muscle, back, or extremity pain  HEME/LYMPH: No easy bruising, or bleeding gums  ALLERY AND IMMUNOLOGIC: No hives or eczema      MEDS:  dextrose 5% + sodium chloride 0.9%. 1000 milliLiter(s) IV Continuous <Continuous>  gabapentin 100 milliGRAM(s) Oral two times a day  lidocaine   Patch 1 Patch Transdermal daily  morphine  - Injectable 2 milliGRAM(s) IV Push every 4 hours PRN  pantoprazole  Injectable 40 milliGRAM(s) IV Push two times a day    VITALS:  Vital Signs Last 24 Hrs  T(C): 36.6 (2018 06:06), Max: 37.2 (2018 22:31)  T(F): 97.9 (2018 06:06), Max: 99 (2018 22:31)  HR: 74 (2018 06:06) (55 - 74)  BP: 113/78 (2018 06:06) (100/50 - 113/78)  BP(mean): --  RR: 16 (2018 06:06) (16 - 18)  SpO2: 99% (2018 06:06) (97% - 100%)    GENERAL: NAD  HEAD:  Atraumatic, Normocephalic  EYES: EOMI, PERRLA, conjunctiva and sclera clear  ENMT: Moist mucous membranes  NECK: Supple  NERVOUS SYSTEM:  Alert & Oriented X3  CHEST/LUNG: Clear to auscultation bilaterally; No rales, rhonchi, wheezing, or rubs  HEART: Regular rate and rhythm; No murmurs, rubs, or gallops  ABDOMEN: Soft, Nontender, Nondistended; Bowel sounds present  EXTREMITIES:  2+ Peripheral Pulses, No clubbing, cyanosis, or edema  SKIN: No rashes or lesions    LABS/DIAGNOSTIC TESTS:                        13.5   6.60  )-----------( 179      ( 2018 05:31 )             39.9     WBC Count: 6.60 K/uL ( @ 05:31)  WBC Count: 15.94 K/uL ( @ 23:20)        142  |  108<H>  |  5<L>  ----------------------------<  98  3.6   |  23  |  0.84    Ca    8.3<L>      2018 05:30    TPro  6.3  /  Alb  3.7  /  TBili  0.7  /  DBili  x   /  AST  20  /  ALT  12  /  AlkPhos  34<L>        Urinalysis Basic - ( 2018 01:43 )    Color: PLYEL / Appearance: CLEAR / S.017 / pH: 7.0  Gluc: NEGATIVE / Ketone: NEGATIVE  / Bili: NEGATIVE / Urobili: NORMAL mg/dL   Blood: NEGATIVE / Protein: NEGATIVE mg/dL / Nitrite: NEGATIVE   Leuk Esterase: NEGATIVE / RBC: 2-5 / WBC 0-2   Sq Epi: x / Non Sq Epi: x / Bacteria: x        LIVER FUNCTIONS - ( 2018 05:30 )  Alb: 3.7 g/dL / Pro: 6.3 g/dL / ALK PHOS: 34 u/L / ALT: 12 u/L / AST: 20 u/L / GGT: x               < from: CT Abdomen and Pelvis w/ IV Cont (18 @ 01:25) >  EXAM:  CT ABDOMEN AND PELVIS IC        PROCEDURE DATE:  2018         INTERPRETATION:  CLINICAL INFORMATION: Abdominal pain, assess SBO.    PROCEDURE:   Helical axial images were obtained from the domes of the diaphragm   through the pubic symphysis following the administration of intravenous   contrast. 90 mls of Omnipaque-350 administered without complication. 10   ml(s) discarded. Coronal and sagittal reformats were also obtained.    COMPARISON: 1/10/2018.    FINDINGS:    LOWER CHEST: Unremarkable.    LIVER: Unremarkable.  GALLBLADDER/BILE DUCTS: No intrahepatic or extrahepatic biliary   dilatation. No radiopaque gallstone.  PANCREAS: Unremarkable.  SPLEEN: Unremarkable.    ADRENALS: Unremarkable.  KIDNEYS/URETERS: No hydronephrosis, hydroureter or perinephric stranding.   BLADDER: Partially distended.  REPRODUCTIVE ORGANS: Unremarkable.    BOWEL: No bowel obstruction. The appendix not visualized, but no   secondary signs of appendicitis. Prominent wall of the proximal/mid   transverse colon without significant inflammatory change.  PERITONEUM: No drainable fluid collection or free air.  VESSELS: Unremarkable.   RETROPERITONEUM: No lymphadenopathy.    ABDOMINAL WALL/SOFT TISSUES: Tiny fat-containing umbilical hernia.  BONES: Schmorl's nodes. Stable nonspecific subcentimeter sclerotic foci   in the pelvic bones and femurs.    IMPRESSION:     No bowel obstruction. Prominent wall of the proximal/mid transverse colon   without significant inflammatory change, which may be due to partial   distention. Recommend clinical question to assess colitis.    < end of copied text > HPI:  26 y/o M with PMHX of pancreatitis in childhood, bipolar disorder and PSHx of appendectomy in  came in with complaints of RLQ pain and persistent vomiting. Patient states that on Saturday evening  he suddenly started having severe pain and vomiting which at first consisted of the food particles and was later brown in color. The abdominal pain was constant, located in RLQ, radiating to the groin, 10/10 in intensity, and "pressure/squeezing" in nature.  He said he had chronic RLQ pain which is constant and usually 5-6/10. He had detailed workup in the past to assess for the etiology of the pain, but so far the work up has been negative. He denies fever, hematochezia, melena, or diarrhea. He did not have a bowel movement since admission. He also states being constipated last week. Denies recent travel history. Also states being intolerant to all milk products.     Patient was admitted in 2018 for abdominal pain and vomiting. He reports having a dystonic reaction to the medications given. He was again admitted later that month for odd behaviour and was transferred to inpatient Psych at [facility] for diagnosis of bipolar affective disorder.      Due to chronic RLQ pain, patient had colonoscopy in Spring of 2017 by Dr. Connor Aquino . After 1 month, he had diagnostic laparoscopy by Dr Drake to look for possible adhesions/scar. Few months later he had EGD by Dr Wilmer Flores. Per patient, all the workup was done at [facility] and was normal, except for the EGD showing mild gastritis.    In fact review of records in [employer] revealed a normal colonoscopy performed by Dr. Ramos at [facility] and an upper endoscopy revealing reflux esophagitis and normal stomach and duodenum performed by Dr. Flores at [facility]; biopsies were negative for H. pylori gastritis and Campos's esophagus.      PAST MEDICAL & SURGICAL HISTORY:  Pneumonia: 2016  Chest pain: cardiac work up diagnosed GERD   Anal fissure  GERD (gastroesophageal reflux disease): resolved, on no med  S/P appendectomy in   Diagnostic lapatroscopy- May 2017- negative  EGD/Colonoscopy-  showing gastritis      ALLERGIES: Reglan (Other)  Zofran (Other)    SOCIAL HISTORY:   He was born in [place] and immigrated to [place] when he was 1 month old.   He lives with his mother and 3 elder sisters.   He works at property management development.   Denies history of smoking or alcohol use.   Also denies use of illicit drugs.     FAMILY HISTORY:  Hypothyroidism- Mother, sisters, and maternal Aunts  DM- Mother   CAD- paternal uncle  Dementia- Father  Testicular cancer and death at the age of 30- Paternal uncle  Breast cancer- Maternal aunt (Alive and doing well)  Ovarian cyst s/p b/l SBO and RADHA- Mother    No known history of gallstones, H pylori, Gastric ulcer, polyps, IBD, IBS, colon cancer, or gastric cancer.    REVIEW OF SYSTEMS:  CONSTITUTIONAL: No fever, weight loss, or fatigue  EYES: No eye pain, visual disturbances, or discharge  ENMT:  No difficulty hearing, tinnitus, vertigo; No sinus or throat pain  BREASTS: No pain, masses, or nipple discharge  RESPIRATORY: No cough, wheezing, chills or hemoptysis; No shortness of breath  CARDIOVASCULAR: No chest pain, palpitations, dizziness, or leg swelling  GASTROINTESTINAL: RLQ pain, and vomiting  GENITOURINARY: No dysuria, frequency, hematuria, or incontinence  NEUROLOGICAL: No headaches, memory loss, loss of strength, numbness, or tremors  ENDOCRINE: No heat or cold intolerance; No hair loss  MUSCULOSKELETAL: No joint pain or swelling; No muscle, back, or extremity pain  HEME/LYMPH: No easy bruising, or bleeding gums  ALLERY AND IMMUNOLOGIC: No hives or eczema      MEDS:  dextrose 5% + sodium chloride 0.9%. 1000 milliLiter(s) IV Continuous <Continuous>  gabapentin 100 milliGRAM(s) Oral two times a day  lidocaine   Patch 1 Patch Transdermal daily  morphine  - Injectable 2 milliGRAM(s) IV Push every 4 hours PRN  pantoprazole  Injectable 40 milliGRAM(s) IV Push two times a day    VITALS:  Vital Signs Last 24 Hrs  T(C): 36.6 (2018 06:06), Max: 37.2 (2018 22:31)  T(F): 97.9 (2018 06:06), Max: 99 (2018 22:31)  HR: 74 (2018 06:06) (55 - 74)  BP: 113/78 (2018 06:06) (100/50 - 113/78)  BP(mean): --  RR: 16 (2018 06:06) (16 - 18)  SpO2: 99% (2018 06:06) (97% - 100%)    GENERAL: NAD  HEAD:  Atraumatic, Normocephalic  EYES: EOMI, PERRLA, conjunctiva and sclera clear  ENMT: Moist mucous membranes  NECK: Supple  NERVOUS SYSTEM:  Alert & Oriented X3  CHEST/LUNG: Clear to auscultation bilaterally; No rales, rhonchi, wheezing, or rubs  HEART: Regular rate and rhythm; No murmurs, rubs, or gallops  ABDOMEN: Soft, Nontender, Nondistended; Bowel sounds present  EXTREMITIES:  2+ Peripheral Pulses, No clubbing, cyanosis, or edema  SKIN: No rashes or lesions    LABS/DIAGNOSTIC TESTS:                        13.5   6.60  )-----------( 179      ( 2018 05:31 )             39.9     WBC Count: 6.60 K/uL ( @ 05:31)  WBC Count: 15.94 K/uL ( @ 23:20)        142  |  108<H>  |  5<L>  ----------------------------<  98  3.6   |  23  |  0.84    Ca    8.3<L>      2018 05:30    TPro  6.3  /  Alb  3.7  /  TBili  0.7  /  DBili  x   /  AST  20  /  ALT  12  /  AlkPhos  34<L>        Urinalysis Basic - ( 2018 01:43 )    Color: PLYEL / Appearance: CLEAR / S.017 / pH: 7.0  Gluc: NEGATIVE / Ketone: NEGATIVE  / Bili: NEGATIVE / Urobili: NORMAL mg/dL   Blood: NEGATIVE / Protein: NEGATIVE mg/dL / Nitrite: NEGATIVE   Leuk Esterase: NEGATIVE / RBC: 2-5 / WBC 0-2   Sq Epi: x / Non Sq Epi: x / Bacteria: x        LIVER FUNCTIONS - ( 2018 05:30 )  Alb: 3.7 g/dL / Pro: 6.3 g/dL / ALK PHOS: 34 u/L / ALT: 12 u/L / AST: 20 u/L / GGT: x               < from: CT Abdomen and Pelvis w/ IV Cont (18 @ 01:25) >  EXAM:  CT ABDOMEN AND PELVIS IC        PROCEDURE DATE:  2018         INTERPRETATION:  CLINICAL INFORMATION: Abdominal pain, assess SBO.    PROCEDURE:   Helical axial images were obtained from the domes of the diaphragm   through the pubic symphysis following the administration of intravenous   contrast. 90 mls of Omnipaque-350 administered without complication. 10   ml(s) discarded. Coronal and sagittal reformats were also obtained.    COMPARISON: 1/10/2018.    FINDINGS:    LOWER CHEST: Unremarkable.    LIVER: Unremarkable.  GALLBLADDER/BILE DUCTS: No intrahepatic or extrahepatic biliary   dilatation. No radiopaque gallstone.  PANCREAS: Unremarkable.  SPLEEN: Unremarkable.    ADRENALS: Unremarkable.  KIDNEYS/URETERS: No hydronephrosis, hydroureter or perinephric stranding.   BLADDER: Partially distended.  REPRODUCTIVE ORGANS: Unremarkable.    BOWEL: No bowel obstruction. The appendix not visualized, but no   secondary signs of appendicitis. Prominent wall of the proximal/mid   transverse colon without significant inflammatory change.  PERITONEUM: No drainable fluid collection or free air.  VESSELS: Unremarkable.   RETROPERITONEUM: No lymphadenopathy.    ABDOMINAL WALL/SOFT TISSUES: Tiny fat-containing umbilical hernia.  BONES: Schmorl's nodes. Stable nonspecific subcentimeter sclerotic foci   in the pelvic bones and femurs.    IMPRESSION:     No bowel obstruction. Prominent wall of the proximal/mid transverse colon   without significant inflammatory change, which may be due to partial   distention. Recommend clinical question to assess colitis.    < end of copied text >

## 2018-06-26 ENCOUNTER — TRANSCRIPTION ENCOUNTER (OUTPATIENT)
Age: 28
End: 2018-06-26

## 2018-06-26 VITALS
TEMPERATURE: 98 F | HEART RATE: 63 BPM | OXYGEN SATURATION: 98 % | SYSTOLIC BLOOD PRESSURE: 121 MMHG | DIASTOLIC BLOOD PRESSURE: 81 MMHG | RESPIRATION RATE: 17 BRPM

## 2018-06-26 LAB
AMYLASE P1 CFR SERPL: 68 U/L — SIGNIFICANT CHANGE UP (ref 25–125)
BUN SERPL-MCNC: 5 MG/DL — LOW (ref 7–23)
CALCIUM SERPL-MCNC: 8.5 MG/DL — SIGNIFICANT CHANGE UP (ref 8.4–10.5)
CHLORIDE SERPL-SCNC: 105 MMOL/L — SIGNIFICANT CHANGE UP (ref 98–107)
CO2 SERPL-SCNC: 25 MMOL/L — SIGNIFICANT CHANGE UP (ref 22–31)
CREAT SERPL-MCNC: 0.9 MG/DL — SIGNIFICANT CHANGE UP (ref 0.5–1.3)
FERRITIN SERPL-MCNC: 241.9 NG/ML — SIGNIFICANT CHANGE UP (ref 30–400)
FOLATE SERPL-MCNC: 9.5 NG/ML — SIGNIFICANT CHANGE UP (ref 4.7–20)
GLUCOSE SERPL-MCNC: 90 MG/DL — SIGNIFICANT CHANGE UP (ref 70–99)
HCT VFR BLD CALC: 41.5 % — SIGNIFICANT CHANGE UP (ref 39–50)
HGB BLD-MCNC: 14.3 G/DL — SIGNIFICANT CHANGE UP (ref 13–17)
IRON SATN MFR SERPL: 92 UG/DL — SIGNIFICANT CHANGE UP (ref 45–165)
LDH SERPL L TO P-CCNC: 159 U/L — SIGNIFICANT CHANGE UP (ref 135–225)
LIDOCAIN IGE QN: 64.4 U/L — HIGH (ref 7–60)
MAGNESIUM SERPL-MCNC: 1.7 MG/DL — SIGNIFICANT CHANGE UP (ref 1.6–2.6)
MCHC RBC-ENTMCNC: 26.4 PG — LOW (ref 27–34)
MCHC RBC-ENTMCNC: 34.5 % — SIGNIFICANT CHANGE UP (ref 32–36)
MCV RBC AUTO: 76.7 FL — LOW (ref 80–100)
NRBC # FLD: 0 — SIGNIFICANT CHANGE UP
PHOSPHATE SERPL-MCNC: 4.3 MG/DL — SIGNIFICANT CHANGE UP (ref 2.5–4.5)
PLATELET # BLD AUTO: 198 K/UL — SIGNIFICANT CHANGE UP (ref 150–400)
PMV BLD: 10.9 FL — SIGNIFICANT CHANGE UP (ref 7–13)
POTASSIUM SERPL-MCNC: 3.6 MMOL/L — SIGNIFICANT CHANGE UP (ref 3.5–5.3)
POTASSIUM SERPL-SCNC: 3.6 MMOL/L — SIGNIFICANT CHANGE UP (ref 3.5–5.3)
RBC # BLD: 5.41 M/UL — SIGNIFICANT CHANGE UP (ref 4.2–5.8)
RBC # FLD: 13 % — SIGNIFICANT CHANGE UP (ref 10.3–14.5)
RETICS #: 54 K/UL — SIGNIFICANT CHANGE UP (ref 25–125)
RETICS/RBC NFR: 1 % — SIGNIFICANT CHANGE UP (ref 0.5–2.5)
SODIUM SERPL-SCNC: 143 MMOL/L — SIGNIFICANT CHANGE UP (ref 135–145)
SURGICAL PATHOLOGY STUDY: SIGNIFICANT CHANGE UP
TSH SERPL-MCNC: 2.07 UIU/ML — SIGNIFICANT CHANGE UP (ref 0.27–4.2)
WBC # BLD: 5.93 K/UL — SIGNIFICANT CHANGE UP (ref 3.8–10.5)
WBC # FLD AUTO: 5.93 K/UL — SIGNIFICANT CHANGE UP (ref 3.8–10.5)

## 2018-06-26 PROCEDURE — 99232 SBSQ HOSP IP/OBS MODERATE 35: CPT | Mod: GC

## 2018-06-26 RX ORDER — ACETAMINOPHEN 500 MG
650 TABLET ORAL ONCE
Qty: 0 | Refills: 0 | Status: COMPLETED | OUTPATIENT
Start: 2018-06-26 | End: 2018-06-26

## 2018-06-26 RX ORDER — PANTOPRAZOLE SODIUM 20 MG/1
1 TABLET, DELAYED RELEASE ORAL
Qty: 60 | Refills: 0 | OUTPATIENT
Start: 2018-06-26 | End: 2018-07-25

## 2018-06-26 RX ORDER — IBUPROFEN 200 MG
400 TABLET ORAL ONCE
Qty: 0 | Refills: 0 | Status: COMPLETED | OUTPATIENT
Start: 2018-06-26 | End: 2018-06-26

## 2018-06-26 RX ORDER — GABAPENTIN 400 MG/1
200 CAPSULE ORAL
Qty: 0 | Refills: 0 | Status: DISCONTINUED | OUTPATIENT
Start: 2018-06-26 | End: 2018-06-26

## 2018-06-26 RX ORDER — GABAPENTIN 400 MG/1
2 CAPSULE ORAL
Qty: 80 | Refills: 0 | OUTPATIENT
Start: 2018-06-26 | End: 2018-07-15

## 2018-06-26 RX ORDER — IBUPROFEN 200 MG
400 TABLET ORAL ONCE
Qty: 0 | Refills: 0 | Status: DISCONTINUED | OUTPATIENT
Start: 2018-06-26 | End: 2018-06-26

## 2018-06-26 RX ORDER — PANTOPRAZOLE SODIUM 20 MG/1
1 TABLET, DELAYED RELEASE ORAL
Qty: 30 | Refills: 0 | OUTPATIENT
Start: 2018-06-26 | End: 2018-07-25

## 2018-06-26 RX ADMIN — MORPHINE SULFATE 2 MILLIGRAM(S): 50 CAPSULE, EXTENDED RELEASE ORAL at 06:31

## 2018-06-26 RX ADMIN — GABAPENTIN 200 MILLIGRAM(S): 400 CAPSULE ORAL at 18:34

## 2018-06-26 RX ADMIN — MORPHINE SULFATE 2 MILLIGRAM(S): 50 CAPSULE, EXTENDED RELEASE ORAL at 10:45

## 2018-06-26 RX ADMIN — Medication 650 MILLIGRAM(S): at 00:27

## 2018-06-26 RX ADMIN — MORPHINE SULFATE 2 MILLIGRAM(S): 50 CAPSULE, EXTENDED RELEASE ORAL at 10:28

## 2018-06-26 RX ADMIN — MORPHINE SULFATE 2 MILLIGRAM(S): 50 CAPSULE, EXTENDED RELEASE ORAL at 06:01

## 2018-06-26 RX ADMIN — Medication 400 MILLIGRAM(S): at 15:55

## 2018-06-26 RX ADMIN — LIDOCAINE 1 PATCH: 4 CREAM TOPICAL at 00:25

## 2018-06-26 RX ADMIN — SODIUM CHLORIDE 100 MILLILITER(S): 9 INJECTION, SOLUTION INTRAVENOUS at 07:38

## 2018-06-26 RX ADMIN — PANTOPRAZOLE SODIUM 40 MILLIGRAM(S): 20 TABLET, DELAYED RELEASE ORAL at 06:00

## 2018-06-26 RX ADMIN — GABAPENTIN 100 MILLIGRAM(S): 400 CAPSULE ORAL at 06:01

## 2018-06-26 RX ADMIN — LIDOCAINE 1 PATCH: 4 CREAM TOPICAL at 12:32

## 2018-06-26 NOTE — DISCHARGE NOTE ADULT - HOSPITAL COURSE
27M with chronic RLQ abd pain after appendectomy over a decade ago p/w abd pain and retching. Pt's family reports sx started around 1945 with worsening pain, multiple episodes of vomiting stomach contents. Has had pain before but the nausea is new. Chart review shows multiple previous ED visits for abd pain and vomiting. No fever, diarrhea, CP, SOB. Follows with pain management.     Problem/Plan - 1:  ·  Problem: Acute ON Chronic Abdominal pain.  Plan: NPO, Pain medications and IVF  . GI consult noted. EGD done - f/u bx results outpatient     Problem/Plan - 2:  ·  Problem: Vomiting.  Plan: Urine tox screen positive for Cannabis ...Will give IV Zofran pRN - tolerating diet without any nausea     Problem/Plan - 3:  ·  Problem: GERD (gastroesophageal reflux disease).  Plan: PPI.     dispo; home

## 2018-06-26 NOTE — PROGRESS NOTE ADULT - ASSESSMENT
27M with chronic RLQ abd pain after appendectomy over a decade ago p/w abd pain and retching. Pt's family reports sx started around 1945 with worsening pain, multiple episodes of vomiting stomach contents. Has had pain before but the nausea is new. Chart review shows multiple previous ED visits for abd pain and vomiting. No fever, diarrhea, CP, SOB. Follows with pain management.     Problem/Plan - 1:  ·  Problem: Acute ON Chronic Abdominal pain.  Plan: NPO, Pain medications and IVF  . GI consult noted and awaiting EGD.       Problem/Plan - 2:  ·  Problem: Vomiting.  Plan: Urine tox screen positive for Cannabis ...Will give IV Zofran pRN.      Problem/Plan - 3:  ·  Problem: GERD (gastroesophageal reflux disease).  Plan: PPI.        DVT Prophylaxis : Ambulating.
27M with chronic RLQ abd pain after appendectomy over a decade ago p/w abd pain and retching. Pt's family reports sx started around 1945 with worsening pain, multiple episodes of vomiting stomach contents. Has had pain before but the nausea is new. Chart review shows multiple previous ED visits for abd pain and vomiting. No fever, diarrhea, CP, SOB. Follows with pain management.     Problem/Plan - 1:  ·  Problem: Acute ON Chronic Abdominal pain.  Plan: NPO, Pain medications and IVF  . GI consult noted and S/P  EGD...  < from: Upper Endoscopy (06.25.18 @ 15:43) >  Impression:     - Normal larynx.                       - Normal esophagus.                       - Z-line regular, 37 cm from the incisors.                       - Congestive gastropathy- likely secondary to gastric                        contusion from vomiting. Biopsied.                       - Normal first part of the duodenum and 2nd part of the                        duodenum. Biopsied.                       - Biopsies were taken with a cold forceps for                        Helicobacter pylori testing.  Recommendation:      - Return patient to hospital givens for ongoing care.                       - Advance diet as tolerated.                       - Await pathology results.                       - Avoid Marijuana use.    < end of copied text >    Advancing diet and increasing Gabapentin.      Problem/Plan - 2:  ·  Problem: Vomiting.  Plan: Urine tox screen positive for Cannabis ...Will give IV Zofran pRN.      Problem/Plan - 3:  ·  Problem: GERD (gastroesophageal reflux disease).  Plan: PPI.        DVT Prophylaxis : Ambulating.     DC planning.
Impression:    Abdominal pain- suspect cannabinoid hyperemesis syndrome, acute infectious gastroenteritis.     Recommendations:  - continue anti-emetics as needed  - cannabinoid avoidance strongly encouraged  - advance diet as tolerated, low residue, no lactose   - Stool studies (GI PCR) to rule out infectious etiology if patient has a bowel movement  - follow up pathology  from upper endoscopy  - if patient is tolerating diet can discharge with follow up in GI clinic [phone]    Please call with questions  María Fung  GI Fellow  Pager: [phone]

## 2018-06-26 NOTE — DISCHARGE NOTE ADULT - CARE PLAN
Principal Discharge DX:	Abdominal pain  Assessment and plan of treatment:	Continue diet as tolerated  Secondary Diagnosis:	Vomiting  Assessment and plan of treatment:	resolved-  continue diet as tolerated  Secondary Diagnosis:	GERD (gastroesophageal reflux disease)  Assessment and plan of treatment:	Continue protonix as ordered  Secondary Diagnosis:	Cannabis abuse  Assessment and plan of treatment:	STOP USING Marijuana Principal Discharge DX:	Abdominal pain  Goal:	be free from pain -  Assessment and plan of treatment:	Continue diet as tolerated  Follow up with GI within 1 month of discharge or sooner for worsening symptoms  follow up in GI clinic [phone] for - follow up pathology  from upper endoscopy  Secondary Diagnosis:	Vomiting  Assessment and plan of treatment:	resolved-  continue diet as tolerated  Secondary Diagnosis:	GERD (gastroesophageal reflux disease)  Assessment and plan of treatment:	Continue Protonix as ordered  Secondary Diagnosis:	Cannabis abuse  Assessment and plan of treatment:	STOP USING Marijuana

## 2018-06-26 NOTE — DISCHARGE NOTE ADULT - MEDICATION SUMMARY - MEDICATIONS TO TAKE
I will START or STAY ON the medications listed below when I get home from the hospital:    gabapentin 100 mg oral capsule  -- 2 cap(s) by mouth 2 times a day  -- Indication: For pain    Protonix 40 mg oral delayed release tablet  -- 1 tab(s) by mouth once a day   -- Indication: For GERD (gastroesophageal reflux disease)

## 2018-06-26 NOTE — PROGRESS NOTE ADULT - ATTENDING COMMENTS
I have seen and evaluated the patient with the GI Fellow and GI Team.  I agree with the findings, formulation and plan of care as documented in the fellow's note, except as noted.

## 2018-06-26 NOTE — DISCHARGE NOTE ADULT - PLAN OF CARE
Continue diet as tolerated resolved-  continue diet as tolerated Continue protonix as ordered STOP USING Marijuana be free from pain - Continue diet as tolerated  Follow up with GI within 1 month of discharge or sooner for worsening symptoms  follow up in GI clinic [phone] for - follow up pathology  from upper endoscopy Continue Protonix as ordered

## 2018-06-26 NOTE — DISCHARGE NOTE ADULT - PATIENT PORTAL LINK FT
You can access the [facility] Patient Portal, offered by [facility], by registering with the following website: [email or web address]

## 2018-06-26 NOTE — PROGRESS NOTE ADULT - SUBJECTIVE AND OBJECTIVE BOX
INTERVAL HPI/OVERNIGHT EVENTS: I still have pain . Sister in room.   Vital Signs Last 24 Hrs  T(C): 36.7 (2018 10:55), Max: 37.2 (2018 22:31)  T(F): 98 (2018 10:55), Max: 99 (2018 22:31)  HR: 64 (2018 10:55) (55 - 74)  BP: 108/71 (2018 10:55) (108/70 - 113/78)  BP(mean): --  RR: 17 (2018 10:55) (16 - 18)  SpO2: 100% (2018 10:55) (97% - 100%)  I&O's Summary    2018 07:01  -  2018 07:00  --------------------------------------------------------  IN: 900 mL / OUT: 0 mL / NET: 900 mL    2018 07:01  -  2018 14:00  --------------------------------------------------------  IN: 700 mL / OUT: 320 mL / NET: 380 mL      MEDICATIONS  (STANDING):  dextrose 5% + sodium chloride 0.9%. 1000 milliLiter(s) (100 mL/Hr) IV Continuous <Continuous>  gabapentin 100 milliGRAM(s) Oral two times a day  lidocaine   Patch 1 Patch Transdermal daily  pantoprazole  Injectable 40 milliGRAM(s) IV Push two times a day    MEDICATIONS  (PRN):  morphine  - Injectable 2 milliGRAM(s) IV Push every 4 hours PRN Severe Pain (7 - 10)    LABS:                        13.5   6.60  )-----------( 179      ( 2018 05:31 )             39.9     06-25    142  |  108<H>  |  5<L>  ----------------------------<  98  3.6   |  23  |  0.84    Ca    8.3<L>      2018 05:30    TPro  6.3  /  Alb  3.7  /  TBili  0.7  /  DBili  x   /  AST  20  /  ALT  12  /  AlkPhos  34<L>  06-25      Urinalysis Basic - ( 2018 01:43 )    Color: PLYEL / Appearance: CLEAR / S.017 / pH: 7.0  Gluc: NEGATIVE / Ketone: NEGATIVE  / Bili: NEGATIVE / Urobili: NORMAL mg/dL   Blood: NEGATIVE / Protein: NEGATIVE mg/dL / Nitrite: NEGATIVE   Leuk Esterase: NEGATIVE / RBC: 2-5 / WBC 0-2   Sq Epi: x / Non Sq Epi: x / Bacteria: x      CAPILLARY BLOOD GLUCOSE            Urinalysis Basic - ( 2018 01:43 )    Color: PLYEL / Appearance: CLEAR / S.017 / pH: 7.0  Gluc: NEGATIVE / Ketone: NEGATIVE  / Bili: NEGATIVE / Urobili: NORMAL mg/dL   Blood: NEGATIVE / Protein: NEGATIVE mg/dL / Nitrite: NEGATIVE   Leuk Esterase: NEGATIVE / RBC: 2-5 / WBC 0-2   Sq Epi: x / Non Sq Epi: x / Bacteria: x      REVIEW OF SYSTEMS:  CONSTITUTIONAL: No fever, weight loss, or fatigue  EYES: No eye pain, visual disturbances, or discharge  ENMT:  No difficulty hearing, tinnitus, vertigo; No sinus or throat pain  NECK: No pain or stiffness  BREASTS: No pain, masses, or nipple discharge  RESPIRATORY: No cough, wheezing, chills or hemoptysis; No shortness of breath  CARDIOVASCULAR: No chest pain, palpitations, dizziness, or leg swelling  GASTROINTESTINAL: No abdominal or epigastric pain. No nausea, vomiting, or hematemesis; No diarrhea or constipation. No melena or hematochezia.  GENITOURINARY: No dysuria, frequency, hematuria, or incontinence  NEUROLOGICAL: No headaches, memory loss, loss of strength, numbness, or tremors  SKIN: No itching, burning, rashes, or lesions   LYMPH NODES: No enlarged glands  ENDOCRINE: No heat or cold intolerance; No hair loss  MUSCULOSKELETAL: No joint pain or swelling; No muscle, back, or extremity pain  PSYCHIATRIC: No depression, anxiety, mood swings, or difficulty sleeping  HEME/LYMPH: No easy bruising, or bleeding gums  ALLERY AND IMMUNOLOGIC: No hives or eczema    RADIOLOGY & ADDITIONAL TESTS:    Consultant(s) Notes Reviewed:  [x ] YES  [ ] NO    PHYSICAL EXAM:  GENERAL: NAD, well-groomed, well-developed,not in any distress ,  HEAD:  Atraumatic, Normocephalic  EYES: EOMI, PERRLA, conjunctiva and sclera clear  ENMT: No tonsillar erythema, exudates, or enlargement; Moist mucous membranes, Good dentition, No lesions  NECK: Supple, No JVD, Normal thyroid  NERVOUS SYSTEM:  Alert & Oriented X3, No focal deficit   CHEST/LUNG: Good air entry bilateral with no  rales, rhonchi, wheezing, or rubs  HEART: Regular rate and rhythm; No murmurs, rubs, or gallops  ABDOMEN: Soft, tenderness RLQ, Nondistended; Bowel sounds present  EXTREMITIES:  2+ Peripheral Pulses, No clubbing, cyanosis, or edema  SKIN: No rashes or lesions    Care Discussed with Consultants/Other Providers [ x] YES  [ ] NO
CC: nausea, vomiting, abdominal pain    Interval Events:   -tolerated apple juice and cranberry juice this morning  -only mild nausea that resolved on own  -still with right groin pain which results in nausea  -no fevers or chills   -denies any abdominal pain     HPI: (from H&P 6/25/18)  26 y/o M with PMHX of pancreatitis in childhood, bipolar disorder and PSHx of appendectomy in 2004 came in with complaints of RLQ pain and persistent vomiting. Patient states that on Saturday evening 6/23 he suddenly started having severe pain and vomiting which at first consisted of the food particles and was later brown in color. The abdominal pain was constant, located in RLQ, radiating to the groin, 10/10 in intensity, and "pressure/squeezing" in nature.  He said he had chronic RLQ pain which is constant and usually 5-6/10. He had detailed workup in the past to assess for the etiology of the pain, but so far the work up has been negative. He denies fever, hematochezia, melena, or diarrhea. He did not have a bowel movement since admission. He also states being constipated last week. Denies recent travel history. Also states being intolerant to all milk products.     Patient was admitted in January 2018 for abdominal pain and vomiting. He reports having a dystonic reaction to the medications given. He was again admitted later that month for odd behaviour and was transferred to inpatient Psych at [facility] for diagnosis of bipolar affective disorder.      Due to chronic RLQ pain, patient had colonoscopy in Spring of 2017 by Dr. Connor Aquino . After 1 month, he had diagnostic laparoscopy by Dr Drake to look for possible adhesions/scar. Few months later he had EGD by Dr Wilmer Flores. Per patient, all the workup was done at [facility] and was normal, except for the EGD showing mild gastritis.    In fact review of records in [employer] revealed a normal colonoscopy performed by Dr. Ramos at [facility] and an upper endoscopy revealing reflux esophagitis and normal stomach and duodenum performed by Dr. Flores at [facility]; biopsies were negative for H. pylori gastritis and Campos's esophagus.      PAST MEDICAL & SURGICAL HISTORY:  Pneumonia: 2016  Chest pain: cardiac work up diagnosed GERD 2013  Anal fissure  GERD (gastroesophageal reflux disease): resolved, on no med  S/P appendectomy in 2004  Diagnostic lapatroscopy- May 2017- negative  EGD/Colonoscopy- 2017 showing gastritis      ALLERGIES: Reglan (Other)  Zofran (Other)    SOCIAL HISTORY:   He was born in [place] and immigrated to [place] when he was 1 month old.   He lives with his mother and 3 elder sisters.   He works at property management development.   Denies history of smoking or alcohol use.   Also denies use of illicit drugs.     FAMILY HISTORY:  Hypothyroidism- Mother, sisters, and maternal Aunts  DM- Mother   CAD- paternal uncle  Dementia- Father  Testicular cancer and death at the age of 30- Paternal uncle  Breast cancer- Maternal aunt (Alive and doing well)  Ovarian cyst s/p b/l SBO and RADHA- Mother    No known history of gallstones, H pylori, Gastric ulcer, polyps, IBD, IBS, colon cancer, or gastric cancer.    REVIEW OF SYSTEMS:  CONSTITUTIONAL: No fever, weight loss, or fatigue  EYES: No eye pain, visual disturbances, or discharge  ENMT:  No difficulty hearing, tinnitus, vertigo; No sinus or throat pain  BREASTS: No pain, masses, or nipple discharge  RESPIRATORY: No cough, wheezing, chills or hemoptysis; No shortness of breath  CARDIOVASCULAR: No chest pain, palpitations, dizziness, or leg swelling  GASTROINTESTINAL: RLQ pain, and vomiting  GENITOURINARY: No dysuria, frequency, hematuria, or incontinence  NEUROLOGICAL: No headaches, memory loss, loss of strength, numbness, or tremors  ENDOCRINE: No heat or cold intolerance; No hair loss  MUSCULOSKELETAL: No joint pain or swelling; No muscle, back, or extremity pain  HEME/LYMPH: No easy bruising, or bleeding gums  ALLERY AND IMMUNOLOGIC: No hives or eczema      MEDS:  dextrose 5% + sodium chloride 0.9%. 1000 milliLiter(s) IV Continuous <Continuous>  gabapentin 100 milliGRAM(s) Oral two times a day  lidocaine   Patch 1 Patch Transdermal daily  morphine  - Injectable 2 milliGRAM(s) IV Push every 4 hours PRN  pantoprazole  Injectable 40 milliGRAM(s) IV Push two times a day    Physical Exam:     GENERAL: NAD  HEAD:  Atraumatic, Normocephalic  EYES: EOMI, PERRLA, conjunctiva and sclera clear  ENMT: Moist mucous membranes  NECK: Supple  NERVOUS SYSTEM:  Alert & Oriented X3  CHEST/LUNG: Clear to auscultation bilaterally; No rales, rhonchi, wheezing, or rubs  HEART: Regular rate and rhythm; No murmurs, rubs, or gallops  ABDOMEN: Soft, Nontender, Nondistended; Bowel sounds present  EXTREMITIES:  2+ Peripheral Pulses, No clubbing, cyanosis, or edema  SKIN: No rashes or lesions      Vital Signs:  Vital Signs Last 24 Hrs  T(C): 36.6 (26 Jun 2018 05:56), Max: 36.8 (25 Jun 2018 18:07)  T(F): 97.9 (26 Jun 2018 05:56), Max: 98.3 (25 Jun 2018 18:07)  HR: 55 (26 Jun 2018 05:56) (55 - 87)  BP: 120/79 (26 Jun 2018 05:56) (108/71 - 128/89)  BP(mean): --  RR: 17 (26 Jun 2018 05:56) (17 - 19)  SpO2: 100% (26 Jun 2018 05:56) (99% - 100%)  Daily Height in cm: 182.88 (26 Jun 2018 09:36)    Daily     LABS:                        14.3   5.93  )-----------( 198      ( 26 Jun 2018 07:32 )             41.5     06-26    143  |  105  |  5<L>  ----------------------------<  90  3.6   |  25  |  0.90    Ca    8.5      26 Jun 2018 07:32  Phos  4.3     06-26  Mg     1.7     06-26    TPro  6.3  /  Alb  3.7  /  TBili  0.7  /  DBili  x   /  AST  20  /  ALT  12  /  AlkPhos  34<L>  06-25    LIVER FUNCTIONS - ( 25 Jun 2018 05:30 )  Alb: 3.7 g/dL / Pro: 6.3 g/dL / ALK PHOS: 34 u/L / ALT: 12 u/L / AST: 20 u/L / GGT: x               Amylase Serum68      Lipase serum64.4       Ammonia--        < from: CT Abdomen and Pelvis w/ IV Cont (06.24.18 @ 01:25) >  EXAM:  CT ABDOMEN AND PELVIS IC        PROCEDURE DATE:  Jun 24 2018         INTERPRETATION:  CLINICAL INFORMATION: Abdominal pain, assess SBO.    PROCEDURE:   Helical axial images were obtained from the domes of the diaphragm   through the pubic symphysis following the administration of intravenous   contrast. 90 mls of Omnipaque-350 administered without complication. 10   ml(s) discarded. Coronal and sagittal reformats were also obtained.    COMPARISON: 1/10/2018.    FINDINGS:    LOWER CHEST: Unremarkable.    LIVER: Unremarkable.  GALLBLADDER/BILE DUCTS: No intrahepatic or extrahepatic biliary   dilatation. No radiopaque gallstone.  PANCREAS: Unremarkable.  SPLEEN: Unremarkable.    ADRENALS: Unremarkable.  KIDNEYS/URETERS: No hydronephrosis, hydroureter or perinephric stranding.   BLADDER: Partially distended.  REPRODUCTIVE ORGANS: Unremarkable.    BOWEL: No bowel obstruction. The appendix not visualized, but no   secondary signs of appendicitis. Prominent wall of the proximal/mid   transverse colon without significant inflammatory change.  PERITONEUM: No drainable fluid collection or free air.  VESSELS: Unremarkable.   RETROPERITONEUM: No lymphadenopathy.    ABDOMINAL WALL/SOFT TISSUES: Tiny fat-containing umbilical hernia.  BONES: Schmorl's nodes. Stable nonspecific subcentimeter sclerotic foci   in the pelvic bones and femurs.    IMPRESSION:     No bowel obstruction. Prominent wall of the proximal/mid transverse colon   without significant inflammatory change, which may be due to partial   distention. Recommend clinical question to assess colitis.    < end of copied text >    < from: Upper Endoscopy (06.25.18 @ 15:43) >  [facility]  _______________________________________________________________________________  Patient Name: Pina Vee         Procedure Date: 6/25/2018 3:43 PM  MRN: [id number]                     Account Number: [id number]  YOB: 1990               Admit Type: Inpatient  Room: [place] [address]                         Gender: Male  Attending MD: REGINE OLMSTEAD MD    _______________________________________________________________________________     Procedure:           Upper GI endoscopy  Indications:         Nausea with vomiting  Providers:           REGINE OLMSTEAD MD, JOSE MIRANDA MD (Fellow)  Referring MD:        Irma Calix  Medicines:           Monitored Anesthesia Care  Complications:   No immediate complications.  Procedure:           Pre-Anesthesia Assessment:                       - Prior to the procedure, a History and Physical was                        performed, and patient medications and allergies were       reviewed. The patient is competent. The risks and                        benefits of the procedure and the sedation options and                        risks were discussed with the patient. All questions                        were answered and informed consent was obtained. Patient                        identification and proposed procedure were verified by                        the physician, the nurse and the anesthesiologist in the                        endoscopy suite. Mental Status Examination: alert and                        oriented. Airway Examination: normal oropharyngeal                        airway and neck mobility. Respiratory Examination: clear                        to auscultation. CV Examination: normal. Prophylactic                        Antibiotics: The patient does not require prophylactic                        antibiotics. Prior Anticoagulants: The patient has taken                        no previous anticoagulant or antiplatelet agents. ASA           Grade Assessment: II - A patient with mild systemic                        disease. After reviewing the risks and benefits, the                        patient was deemed in satisfactory condition to undergo                        the procedure. The anesthesia plan was to use monitored                        anesthesia care (MAC). Immediately prior to                        administration of medications, the patient was                        re-assessed for adequacy to receive sedatives. The heart                        rate, respiratory rate, oxygen saturations, blood                        pressure, adequacy of pulmonary ventilation, and                        response to care were monitored throughout the                        procedure. The physical status of the patient was                        re-assessed after the procedure.                       After obtaining informed consent, the endoscope was                        passed under direct vision. Throughout the procedure,                        the patient's blood pressure, pulse, and oxygen                        saturations were monitored continuously. The Endoscope                        was introduced through the mouth, and advanced to the   second part of duodenum. The upper GI endoscopy was                        accomplished without difficulty. The patient tolerated                        the procedure well.      Findings:       The larynx was normal.       The examined esophagus was normal.       The Z-line was regular and was found 37 cm from the incisors.       Localized moderately congested mucosa was found in the gastric fundus.        Biopsies were taken with a cold forceps for histology. Verification of        patient identification for the specimen was done. Estimated blood loss        was minimal.       No other significant abnormalities were identified in a careful        examination of the stomach.       Biopsies were taken with a cold forceps in the gastric antrum for        Helicobacter pylori testing.       The first part of the duodenum and 2nd part of the duodenum were normal.        Biopsies for histology were taken with a cold forceps for evaluation of        celiac disease. Verification of patient identification for the specimen        was done. Estimated blood loss was minimal.                                                                                   Impression:     - Normal larynx.                       - Normal esophagus.                       - Z-line regular, 37 cm from the incisors.                       - Congestive gastropathy- likely secondary to gastric                        contusion from vomiting. Biopsied.                       - Normal first part of the duodenum and 2nd part of the                        duodenum. Biopsied.                       - Biopsies were taken with a cold forceps for                        Helicobacter pylori testing.  Recommendation:      - Return patient to hospital givens for ongoing care.                       - Advance diet as tolerated.                       - Await pathology results.                       - Avoid Marijuana use.    < end of copied text >
INTERVAL HPI/OVERNIGHT EVENTS: I still have pain abdomen.   Vital Signs Last 24 Hrs  T(C): 36.7 (26 Jun 2018 12:46), Max: 36.8 (25 Jun 2018 18:07)  T(F): 98 (26 Jun 2018 12:46), Max: 98.3 (25 Jun 2018 18:07)  HR: 63 (26 Jun 2018 12:46) (55 - 87)  BP: 121/81 (26 Jun 2018 12:46) (111/77 - 128/89)  BP(mean): --  RR: 17 (26 Jun 2018 12:46) (17 - 19)  SpO2: 98% (26 Jun 2018 12:46) (98% - 100%)  I&O's Summary    25 Jun 2018 07:01  -  26 Jun 2018 07:00  --------------------------------------------------------  IN: 900 mL / OUT: 320 mL / NET: 580 mL      MEDICATIONS  (STANDING):  dextrose 5% + sodium chloride 0.9%. 1000 milliLiter(s) (100 mL/Hr) IV Continuous <Continuous>  gabapentin 200 milliGRAM(s) Oral two times a day  lidocaine   Patch 1 Patch Transdermal daily  pantoprazole  Injectable 40 milliGRAM(s) IV Push two times a day    MEDICATIONS  (PRN):  morphine  - Injectable 2 milliGRAM(s) IV Push every 4 hours PRN Severe Pain (7 - 10)    LABS:                        14.3   5.93  )-----------( 198      ( 26 Jun 2018 07:32 )             41.5     06-26    143  |  105  |  5<L>  ----------------------------<  90  3.6   |  25  |  0.90    Ca    8.5      26 Jun 2018 07:32  Phos  4.3     06-26  Mg     1.7     06-26    TPro  6.3  /  Alb  3.7  /  TBili  0.7  /  DBili  x   /  AST  20  /  ALT  12  /  AlkPhos  34<L>  06-25        CAPILLARY BLOOD GLUCOSE              REVIEW OF SYSTEMS:  CONSTITUTIONAL: No fever, weight loss, or fatigue  EYES: No eye pain, visual disturbances, or discharge  ENMT:  No difficulty hearing, tinnitus, vertigo; No sinus or throat pain  NECK: No pain or stiffness  BREASTS: No pain, masses, or nipple discharge  RESPIRATORY: No cough, wheezing, chills or hemoptysis; No shortness of breath  CARDIOVASCULAR: No chest pain, palpitations, dizziness, or leg swelling  GASTROINTESTINAL:  pain  abdominal . No nausea, vomiting, or hematemesis; No diarrhea or constipation. No melena or hematochezia.  GENITOURINARY: No dysuria, frequency, hematuria, or incontinence  NEUROLOGICAL: No headaches, memory loss, loss of strength, numbness, or tremors  SKIN: No itching, burning, rashes, or lesions   LYMPH NODES: No enlarged glands  ENDOCRINE: No heat or cold intolerance; No hair loss  MUSCULOSKELETAL: No joint pain or swelling; No muscle, back, or extremity pain  PSYCHIATRIC: No depression, anxiety, mood swings, or difficulty sleeping  HEME/LYMPH: No easy bruising, or bleeding gums  ALLERY AND IMMUNOLOGIC: No hives or eczema    RADIOLOGY & ADDITIONAL TESTS:    Consultant(s) Notes Reviewed:  [x ] YES  [ ] NO    PHYSICAL EXAM:  GENERAL: NAD, not in any distress ,  HEAD:  Atraumatic, Normocephalic  EYES: EOMI, PERRLA, conjunctiva and sclera clear  ENMT: No tonsillar erythema, exudates, or enlargement; Moist mucous membranes, Good dentition, No lesions  NECK: Supple, No JVD, Normal thyroid  NERVOUS SYSTEM:  Alert & Oriented X3, No focal deficit   CHEST/LUNG: Good air entry bilateral with no  rales, rhonchi, wheezing, or rubs  HEART: Regular rate and rhythm; No murmurs, rubs, or gallops  ABDOMEN: Soft, tenderness RLQ , Nondistended; Bowel sounds present  EXTREMITIES:  2+ Peripheral Pulses, No clubbing, cyanosis, or edema  SKIN: No rashes or lesions    Care Discussed with Consultants/Other Providers [ x] YES  [ ] NO

## 2018-06-26 NOTE — DISCHARGE NOTE ADULT - PROVIDER TOKENS
FREE:[LAST:[Primary Care Provider],PHONE:[(   )    -],FAX:[(   )    -]] FREE:[LAST:[Primary Care Provider],PHONE:[(   )    -],FAX:[(   )    -]],FREE:[LAST:[[facility]],PHONE:[phone]],FAX:[(   )    -]]

## 2018-06-27 LAB
TTG IGA SER-ACNC: 6.1 UNITS — SIGNIFICANT CHANGE UP
TTG IGG SER-ACNC: <5 UNITS — SIGNIFICANT CHANGE UP

## 2018-07-11 ENCOUNTER — APPOINTMENT (OUTPATIENT)
Dept: INTERNAL MEDICINE | Facility: CLINIC | Age: 28
End: 2018-07-11

## 2018-07-20 ENCOUNTER — MEDICATION RENEWAL (OUTPATIENT)
Age: 28
End: 2018-07-20

## 2018-07-20 RX ORDER — OMEPRAZOLE 40 MG/1
40 CAPSULE, DELAYED RELEASE ORAL
Qty: 30 | Refills: 11 | Status: DISCONTINUED | COMMUNITY
Start: 2017-12-20 | End: 2018-07-20

## 2018-08-01 PROBLEM — J18.9 PNEUMONIA, UNSPECIFIED ORGANISM: Chronic | Status: ACTIVE | Noted: 2017-05-16

## 2018-08-01 PROBLEM — K21.9 GASTRO-ESOPHAGEAL REFLUX DISEASE WITHOUT ESOPHAGITIS: Chronic | Status: ACTIVE | Noted: 2017-05-16

## 2018-08-01 PROBLEM — K60.2 ANAL FISSURE, UNSPECIFIED: Chronic | Status: ACTIVE | Noted: 2017-05-16

## 2018-08-01 PROBLEM — R07.9 CHEST PAIN, UNSPECIFIED: Chronic | Status: ACTIVE | Noted: 2017-05-16

## 2018-08-05 ENCOUNTER — INPATIENT (INPATIENT)
Facility: HOSPITAL | Age: 28
LOS: 1 days | Discharge: HOME | End: 2018-08-07
Attending: INTERNAL MEDICINE | Admitting: INTERNAL MEDICINE

## 2018-08-05 VITALS
OXYGEN SATURATION: 100 % | RESPIRATION RATE: 20 BRPM | HEART RATE: 78 BPM | DIASTOLIC BLOOD PRESSURE: 88 MMHG | SYSTOLIC BLOOD PRESSURE: 152 MMHG

## 2018-08-05 DIAGNOSIS — R10.9 UNSPECIFIED ABDOMINAL PAIN: ICD-10-CM

## 2018-08-05 DIAGNOSIS — K21.9 GASTRO-ESOPHAGEAL REFLUX DISEASE WITHOUT ESOPHAGITIS: ICD-10-CM

## 2018-08-05 DIAGNOSIS — R11.2 NAUSEA WITH VOMITING, UNSPECIFIED: ICD-10-CM

## 2018-08-05 DIAGNOSIS — Z90.49 ACQUIRED ABSENCE OF OTHER SPECIFIED PARTS OF DIGESTIVE TRACT: Chronic | ICD-10-CM

## 2018-08-05 LAB
ALBUMIN SERPL ELPH-MCNC: 4.7 G/DL — SIGNIFICANT CHANGE UP (ref 3.3–5)
ALP SERPL-CCNC: 56 U/L — SIGNIFICANT CHANGE UP (ref 40–120)
ALT FLD-CCNC: 17 U/L — SIGNIFICANT CHANGE UP (ref 4–41)
AMPHET UR-MCNC: NEGATIVE — SIGNIFICANT CHANGE UP
APPEARANCE UR: CLEAR — SIGNIFICANT CHANGE UP
AST SERPL-CCNC: 15 U/L — SIGNIFICANT CHANGE UP (ref 4–40)
BARBITURATES UR SCN-MCNC: NEGATIVE — SIGNIFICANT CHANGE UP
BASOPHILS # BLD AUTO: 0.06 K/UL — SIGNIFICANT CHANGE UP (ref 0–0.2)
BASOPHILS NFR BLD AUTO: 0.5 % — SIGNIFICANT CHANGE UP (ref 0–2)
BENZODIAZ UR-MCNC: NEGATIVE — SIGNIFICANT CHANGE UP
BILIRUB SERPL-MCNC: 0.5 MG/DL — SIGNIFICANT CHANGE UP (ref 0.2–1.2)
BILIRUB UR-MCNC: NEGATIVE — SIGNIFICANT CHANGE UP
BLOOD UR QL VISUAL: NEGATIVE — SIGNIFICANT CHANGE UP
BUN SERPL-MCNC: 13 MG/DL — SIGNIFICANT CHANGE UP (ref 7–23)
CALCIUM SERPL-MCNC: 9.6 MG/DL — SIGNIFICANT CHANGE UP (ref 8.4–10.5)
CANNABINOIDS UR-MCNC: POSITIVE — SIGNIFICANT CHANGE UP
CHLORIDE SERPL-SCNC: 102 MMOL/L — SIGNIFICANT CHANGE UP (ref 98–107)
CO2 SERPL-SCNC: 26 MMOL/L — SIGNIFICANT CHANGE UP (ref 22–31)
COCAINE METAB.OTHER UR-MCNC: NEGATIVE — SIGNIFICANT CHANGE UP
COLOR SPEC: YELLOW — SIGNIFICANT CHANGE UP
CREAT SERPL-MCNC: 1.2 MG/DL — SIGNIFICANT CHANGE UP (ref 0.5–1.3)
EOSINOPHIL # BLD AUTO: 0.05 K/UL — SIGNIFICANT CHANGE UP (ref 0–0.5)
EOSINOPHIL NFR BLD AUTO: 0.4 % — SIGNIFICANT CHANGE UP (ref 0–6)
GLUCOSE SERPL-MCNC: 139 MG/DL — HIGH (ref 70–99)
GLUCOSE UR-MCNC: NEGATIVE — SIGNIFICANT CHANGE UP
HCT VFR BLD CALC: 43 % — SIGNIFICANT CHANGE UP (ref 39–50)
HGB BLD-MCNC: 14.8 G/DL — SIGNIFICANT CHANGE UP (ref 13–17)
IMM GRANULOCYTES # BLD AUTO: 0.05 # — SIGNIFICANT CHANGE UP
IMM GRANULOCYTES NFR BLD AUTO: 0.4 % — SIGNIFICANT CHANGE UP (ref 0–1.5)
KETONES UR-MCNC: SIGNIFICANT CHANGE UP
LEUKOCYTE ESTERASE UR-ACNC: NEGATIVE — SIGNIFICANT CHANGE UP
LYMPHOCYTES # BLD AUTO: 1.73 K/UL — SIGNIFICANT CHANGE UP (ref 1–3.3)
LYMPHOCYTES # BLD AUTO: 13.9 % — SIGNIFICANT CHANGE UP (ref 13–44)
MAGNESIUM SERPL-MCNC: 1.8 MG/DL — SIGNIFICANT CHANGE UP (ref 1.6–2.6)
MCHC RBC-ENTMCNC: 27 PG — SIGNIFICANT CHANGE UP (ref 27–34)
MCHC RBC-ENTMCNC: 34.4 % — SIGNIFICANT CHANGE UP (ref 32–36)
MCV RBC AUTO: 78.5 FL — LOW (ref 80–100)
METHADONE UR-MCNC: NEGATIVE — SIGNIFICANT CHANGE UP
MONOCYTES # BLD AUTO: 0.54 K/UL — SIGNIFICANT CHANGE UP (ref 0–0.9)
MONOCYTES NFR BLD AUTO: 4.3 % — SIGNIFICANT CHANGE UP (ref 2–14)
MUCOUS THREADS # UR AUTO: SIGNIFICANT CHANGE UP
NEUTROPHILS # BLD AUTO: 10.02 K/UL — HIGH (ref 1.8–7.4)
NEUTROPHILS NFR BLD AUTO: 80.5 % — HIGH (ref 43–77)
NITRITE UR-MCNC: NEGATIVE — SIGNIFICANT CHANGE UP
NRBC # FLD: 0 — SIGNIFICANT CHANGE UP
OPIATES UR-MCNC: POSITIVE — SIGNIFICANT CHANGE UP
OXYCODONE UR-MCNC: NEGATIVE — SIGNIFICANT CHANGE UP
PCP UR-MCNC: NEGATIVE — SIGNIFICANT CHANGE UP
PH UR: 8.5 — HIGH (ref 4.6–8)
PHOSPHATE SERPL-MCNC: 2 MG/DL — LOW (ref 2.5–4.5)
PLATELET # BLD AUTO: 223 K/UL — SIGNIFICANT CHANGE UP (ref 150–400)
PMV BLD: 11.7 FL — SIGNIFICANT CHANGE UP (ref 7–13)
POTASSIUM SERPL-MCNC: 3.5 MMOL/L — SIGNIFICANT CHANGE UP (ref 3.5–5.3)
POTASSIUM SERPL-SCNC: 3.5 MMOL/L — SIGNIFICANT CHANGE UP (ref 3.5–5.3)
PROT SERPL-MCNC: 7.6 G/DL — SIGNIFICANT CHANGE UP (ref 6–8.3)
PROT UR-MCNC: 20 MG/DL — SIGNIFICANT CHANGE UP
RBC # BLD: 5.48 M/UL — SIGNIFICANT CHANGE UP (ref 4.2–5.8)
RBC # FLD: 13.2 % — SIGNIFICANT CHANGE UP (ref 10.3–14.5)
RBC CASTS # UR COMP ASSIST: SIGNIFICANT CHANGE UP (ref 0–?)
SODIUM SERPL-SCNC: 143 MMOL/L — SIGNIFICANT CHANGE UP (ref 135–145)
SP GR SPEC: 1.02 — SIGNIFICANT CHANGE UP (ref 1–1.04)
UROBILINOGEN FLD QL: NORMAL MG/DL — SIGNIFICANT CHANGE UP
WBC # BLD: 12.45 K/UL — HIGH (ref 3.8–10.5)
WBC # FLD AUTO: 12.45 K/UL — HIGH (ref 3.8–10.5)
WBC UR QL: SIGNIFICANT CHANGE UP (ref 0–?)

## 2018-08-05 RX ORDER — SODIUM CHLORIDE 9 MG/ML
1000 INJECTION INTRAMUSCULAR; INTRAVENOUS; SUBCUTANEOUS ONCE
Qty: 0 | Refills: 0 | Status: COMPLETED | OUTPATIENT
Start: 2018-08-05 | End: 2018-08-05

## 2018-08-05 RX ORDER — PANTOPRAZOLE SODIUM 20 MG/1
40 TABLET, DELAYED RELEASE ORAL
Qty: 0 | Refills: 0 | Status: DISCONTINUED | OUTPATIENT
Start: 2018-08-05 | End: 2018-08-07

## 2018-08-05 RX ORDER — HALOPERIDOL DECANOATE 100 MG/ML
0.5 INJECTION INTRAMUSCULAR ONCE
Qty: 0 | Refills: 0 | Status: DISCONTINUED | OUTPATIENT
Start: 2018-08-05 | End: 2018-08-05

## 2018-08-05 RX ORDER — POTASSIUM PHOSPHATE, MONOBASIC POTASSIUM PHOSPHATE, DIBASIC 236; 224 MG/ML; MG/ML
15 INJECTION, SOLUTION INTRAVENOUS ONCE
Qty: 0 | Refills: 0 | Status: COMPLETED | OUTPATIENT
Start: 2018-08-05 | End: 2018-08-05

## 2018-08-05 RX ORDER — MORPHINE SULFATE 50 MG/1
2 CAPSULE, EXTENDED RELEASE ORAL ONCE
Qty: 0 | Refills: 0 | Status: DISCONTINUED | OUTPATIENT
Start: 2018-08-05 | End: 2018-08-05

## 2018-08-05 RX ORDER — MORPHINE SULFATE 50 MG/1
4 CAPSULE, EXTENDED RELEASE ORAL ONCE
Qty: 0 | Refills: 0 | Status: DISCONTINUED | OUTPATIENT
Start: 2018-08-05 | End: 2018-08-05

## 2018-08-05 RX ORDER — GABAPENTIN 400 MG/1
200 CAPSULE ORAL
Qty: 0 | Refills: 0 | Status: DISCONTINUED | OUTPATIENT
Start: 2018-08-05 | End: 2018-08-06

## 2018-08-05 RX ORDER — SODIUM CHLORIDE 9 MG/ML
1000 INJECTION, SOLUTION INTRAVENOUS
Qty: 0 | Refills: 0 | Status: DISCONTINUED | OUTPATIENT
Start: 2018-08-05 | End: 2018-08-07

## 2018-08-05 RX ORDER — MORPHINE SULFATE 50 MG/1
2 CAPSULE, EXTENDED RELEASE ORAL EVERY 6 HOURS
Qty: 0 | Refills: 0 | Status: DISCONTINUED | OUTPATIENT
Start: 2018-08-05 | End: 2018-08-06

## 2018-08-05 RX ORDER — ONDANSETRON 8 MG/1
4 TABLET, FILM COATED ORAL EVERY 8 HOURS
Qty: 0 | Refills: 0 | Status: DISCONTINUED | OUTPATIENT
Start: 2018-08-05 | End: 2018-08-05

## 2018-08-05 RX ORDER — HALOPERIDOL DECANOATE 100 MG/ML
2 INJECTION INTRAMUSCULAR ONCE
Qty: 0 | Refills: 0 | Status: COMPLETED | OUTPATIENT
Start: 2018-08-05 | End: 2018-08-05

## 2018-08-05 RX ORDER — KETOROLAC TROMETHAMINE 30 MG/ML
15 SYRINGE (ML) INJECTION ONCE
Qty: 0 | Refills: 0 | Status: DISCONTINUED | OUTPATIENT
Start: 2018-08-05 | End: 2018-08-05

## 2018-08-05 RX ORDER — PROCHLORPERAZINE MALEATE 5 MG
10 TABLET ORAL ONCE
Qty: 0 | Refills: 0 | Status: COMPLETED | OUTPATIENT
Start: 2018-08-05 | End: 2018-08-05

## 2018-08-05 RX ORDER — HALOPERIDOL DECANOATE 100 MG/ML
3 INJECTION INTRAMUSCULAR ONCE
Qty: 0 | Refills: 0 | Status: COMPLETED | OUTPATIENT
Start: 2018-08-05 | End: 2018-08-05

## 2018-08-05 RX ADMIN — Medication 10 MILLIGRAM(S): at 09:50

## 2018-08-05 RX ADMIN — HALOPERIDOL DECANOATE 3 MILLIGRAM(S): 100 INJECTION INTRAMUSCULAR at 12:20

## 2018-08-05 RX ADMIN — MORPHINE SULFATE 2 MILLIGRAM(S): 50 CAPSULE, EXTENDED RELEASE ORAL at 17:20

## 2018-08-05 RX ADMIN — HALOPERIDOL DECANOATE 2 MILLIGRAM(S): 100 INJECTION INTRAMUSCULAR at 09:15

## 2018-08-05 RX ADMIN — MORPHINE SULFATE 2 MILLIGRAM(S): 50 CAPSULE, EXTENDED RELEASE ORAL at 09:15

## 2018-08-05 RX ADMIN — SODIUM CHLORIDE 150 MILLILITER(S): 9 INJECTION, SOLUTION INTRAVENOUS at 17:11

## 2018-08-05 RX ADMIN — Medication 15 MILLIGRAM(S): at 12:29

## 2018-08-05 RX ADMIN — Medication 1 MILLIGRAM(S): at 11:11

## 2018-08-05 RX ADMIN — MORPHINE SULFATE 2 MILLIGRAM(S): 50 CAPSULE, EXTENDED RELEASE ORAL at 17:04

## 2018-08-05 RX ADMIN — SODIUM CHLORIDE 2000 MILLILITER(S): 9 INJECTION INTRAMUSCULAR; INTRAVENOUS; SUBCUTANEOUS at 09:15

## 2018-08-05 RX ADMIN — PANTOPRAZOLE SODIUM 40 MILLIGRAM(S): 20 TABLET, DELAYED RELEASE ORAL at 17:07

## 2018-08-05 RX ADMIN — POTASSIUM PHOSPHATE, MONOBASIC POTASSIUM PHOSPHATE, DIBASIC 62.5 MILLIMOLE(S): 236; 224 INJECTION, SOLUTION INTRAVENOUS at 17:10

## 2018-08-05 RX ADMIN — SODIUM CHLORIDE 1000 MILLILITER(S): 9 INJECTION INTRAMUSCULAR; INTRAVENOUS; SUBCUTANEOUS at 12:15

## 2018-08-05 RX ADMIN — Medication 15 MILLIGRAM(S): at 10:25

## 2018-08-05 RX ADMIN — SODIUM CHLORIDE 2000 MILLILITER(S): 9 INJECTION INTRAMUSCULAR; INTRAVENOUS; SUBCUTANEOUS at 13:47

## 2018-08-05 RX ADMIN — MORPHINE SULFATE 2 MILLIGRAM(S): 50 CAPSULE, EXTENDED RELEASE ORAL at 09:50

## 2018-08-05 NOTE — PATIENT PROFILE ADULT. - BILL OF RIGHTS/ADMISSION INFORMATION PROVIDED TO:
Injection Documentation of Provider History    PER-PROVIDER HISTORY, Dr. Carbajal:  Is this for treatment of acute herpes zoster, post herpetic neuralgia, post-decompressive radiculitis, or post-surgical scarring?   No  Does the patient have radiculopathy or sciatica?  Yes  How long has the patient had any physical therapy, home therapy or chiropractor care?  >4 weeks.  How long has the patient taken NSaids or muscle relaxants?  >4 weeks. Previous now on flexerile  Is there any history of systemic/local infection or unstable medical conditions?  No       Patient

## 2018-08-05 NOTE — ED ADULT NURSE NOTE - NSIMPLEMENTINTERV_GEN_ALL_ED
Implemented All Universal Safety Interventions:  [place] to call system. Call bell, personal items and telephone within reach. Instruct patient to call for assistance. Room bathroom lighting operational. Non-slip footwear when patient is off stretcher. Physically safe environment: no spills, clutter or unnecessary equipment. Stretcher in lowest position, wheels locked, appropriate side rails in place.

## 2018-08-05 NOTE — ED PROVIDER NOTE - PHYSICAL EXAMINATION
General appearance: screaming, moving around   Eyes: anicteric sclerae, moist conjunctivae; no lid-lag; Pupils equal, round and reactive to light; Extraocular muscles intact   HENT: Atraumatic; oropharynx clear with moist mucous membranes and no mucosal ulcerations; normal hard and soft palate; no pharyngeal erythema or exudate   Neck: Trachea midline; Full range of motion, supple; no thyromegaly or lymphadenopathy   Pulm: Lungs clear to auscultation bilaterally, with normal respiratory effort and no intercostal retractions; normal work of breathing   CV: Regular Rhythm and Rate; Normal S1, S2; No murmurs, rubs, or gallops. 2+ peripheral pulses.   Abdomen: Soft, non-distended. +R inguinal and RLQ TTP.   : +R testicle TTP. No swelling or erythema.    Extremities: No peripheral edema or extremity lymphadenopathy. 5/5 strength in all four extremities.   Skin: Normal temperature, turgor and texture; no rash, ulcers or subcutaneous nodules General appearance: screaming, moving around   Eyes: anicteric sclerae, moist conjunctivae; no lid-lag; Pupils equal, round and reactive to light; Extraocular muscles intact   HENT: Atraumatic; oropharynx clear with moist mucous membranes and no mucosal ulcerations; normal hard and soft palate; no pharyngeal erythema or exudate   Neck: Trachea midline; Full range of motion, supple; no thyromegaly or lymphadenopathy   Pulm: Lungs clear to auscultation bilaterally, with normal respiratory effort and no intercostal retractions; normal work of breathing   CV: Regular Rhythm and Rate; Normal S1, S2; No murmurs, rubs, or gallops. 2+ peripheral pulses.   Abdomen: Soft, non-distended. +R inguinal and RLQ TTP.   : +R testicle TTP. No swelling or erythema. Chaperoned by Dr. Delgadillo    Extremities: No peripheral edema or extremity lymphadenopathy. 5/5 strength in all four extremities.   Skin: Normal temperature, turgor and texture; no rash, ulcers or subcutaneous nodules

## 2018-08-05 NOTE — ED PROVIDER NOTE - MEDICAL DECISION MAKING DETAILS
28M PMH  intermittent abdominal pain and appendectomy p/w nausea, vomiting and abdominal pain 28M PMH  intermittent abdominal pain and appendectomy p/w nausea, vomiting and abdominal pain. Reports allergy to zofran and reglan. Pt recently had CT abdomen and states pain is unchanged. Also s/p ex lap for same sx. Will not scan at this time. Will try compazine, ativan, haldol. PO challenge if possible. Labs.

## 2018-08-05 NOTE — ED PROVIDER NOTE - NS ED ROS FT
General: denies fever, chills, weight loss/weight gain.  HENT: denies nasal congestion, sore throat, rhinorrhea, ear pain  Eyes: denies visual changes, blurred vision, eye discharge, eye redness  Neck: denies neck pain, neck swelling  CV: denies chest pain, palpitations  Resp: denies difficulty breathing, cough  Abdominal:  see HPI  MSK: denies muscle aches, bony pain, leg pain, leg swelling  Neuro: denies headaches, numbness, tingling, dizziness, lightheadedness.  Skin: denies rashes, cuts, bruises General: denies fever, chills, weight loss/weight gain.  HENT: denies nasal congestion, sore throat, rhinorrhea, ear pain  Eyes: denies visual changes, blurred vision, eye discharge, eye redness  Neck: denies neck pain, neck swelling  CV: denies chest pain, palpitations  Resp: denies difficulty breathing, cough  Abdominal:  see HPI  MSK: denies muscle aches, bony pain, leg pain, leg swelling  Neuro: denies headaches, numbness, tingling, dizziness, lightheadedness.  Skin: denies rashes, cuts, bruises  : +testicular pain

## 2018-08-05 NOTE — H&P ADULT - ASSESSMENT
28 year old M PMH intermittent abdominal pain presenting with nausea, vomiting and right lower abdominal pain. Patient previously noted to have "surgical complication" from appendectomy, but the appendectomy was done 10 years ago and sx have only started in the last year. He had an exploratory laparoscopy in 2017 for the sx that was negative. Patient is an every day marijuana user. Has had multiple CTs for same complaints without any findings. Denies diarrhea. Had normal BM yesterday. Denies fevers, chills. Reports pain radiates down to testicle. Denies any back pain.

## 2018-08-05 NOTE — ED PROVIDER NOTE - ATTENDING CONTRIBUTION TO CARE
Attending note:   After face to face evaluation of this patient, I concur with above noted hx, pe, and care plan for this patient.  27 y/o M with chronic recurrent abdominal pain with negative work-up.   Patient on chronic marijuana cream.   Patient writhing; evaluation in progress

## 2018-08-05 NOTE — ED PROVIDER NOTE - OBJECTIVE STATEMENT
28 year old M PMH intermittent abdominal pain presenting with nausea, vomiting and abdominal pain. Patient previously noted to have "surgical complication" from appendectomy, but the appendectomy was done 10 years ago and sx have only started in the last year. He had an exploratory laparoscopy in 2017 for the sx that was negative. Patient is an every day marijuana user. Has had multiple CTs for same complaints without any findings. Denies diarrhea. Had normal BM yesterday. Denies fevers, chills. 28 year old M PMH intermittent abdominal pain presenting with nausea, vomiting and right lower abdominal pain. Patient previously noted to have "surgical complication" from appendectomy, but the appendectomy was done 10 years ago and sx have only started in the last year. He had an exploratory laparoscopy in 2017 for the sx that was negative. Patient is an every day marijuana user. Has had multiple CTs for same complaints without any findings. Denies diarrhea. Had normal BM yesterday. Denies fevers, chills. Reports pain radiates down to testicle. Denies any back pain.

## 2018-08-05 NOTE — ED PROVIDER NOTE - PROGRESS NOTE DETAILS
Connor: Patient tried hot shower before he left. He reports improvement of pain but persistent nausea. Will give Ativan. Connor: Patient feeling better. Will give bread and PO challenge. Connor:

## 2018-08-05 NOTE — ED ADULT TRIAGE NOTE - CHIEF COMPLAINT QUOTE
pt pale diarphoretic pt vomiting and shaking/  friend states pt had appendics removed and they cut a nerve so pt gets episodes of abd pain and severe vomiting/

## 2018-08-05 NOTE — ED ADULT NURSE NOTE - OBJECTIVE STATEMENT
received pt to rm 29, c/o V received pt to rm 29, c/o abdominal pain and vomiting x1day, patient uncooperative with answering assessment questions, VSS, IV access lft ac 20g, labs sent, patient medicated as ordered, 1L NS bolus initiated, patient denies ETOH/drugs use/smoking/SI/HI, patient is tender on palpation of lower abdomen, vomited 2x upon entering room, MD at bedside.

## 2018-08-06 LAB
ALBUMIN SERPL ELPH-MCNC: 3.8 G/DL — SIGNIFICANT CHANGE UP (ref 3.3–5)
ALP SERPL-CCNC: 41 U/L — SIGNIFICANT CHANGE UP (ref 40–120)
ALT FLD-CCNC: 14 U/L — SIGNIFICANT CHANGE UP (ref 4–41)
AST SERPL-CCNC: 19 U/L — SIGNIFICANT CHANGE UP (ref 4–40)
BILIRUB SERPL-MCNC: 0.6 MG/DL — SIGNIFICANT CHANGE UP (ref 0.2–1.2)
BUN SERPL-MCNC: 10 MG/DL — SIGNIFICANT CHANGE UP (ref 7–23)
CALCIUM SERPL-MCNC: 8.7 MG/DL — SIGNIFICANT CHANGE UP (ref 8.4–10.5)
CHLORIDE SERPL-SCNC: 107 MMOL/L — SIGNIFICANT CHANGE UP (ref 98–107)
CO2 SERPL-SCNC: 23 MMOL/L — SIGNIFICANT CHANGE UP (ref 22–31)
CREAT SERPL-MCNC: 0.87 MG/DL — SIGNIFICANT CHANGE UP (ref 0.5–1.3)
GLUCOSE SERPL-MCNC: 110 MG/DL — HIGH (ref 70–99)
HCT VFR BLD CALC: 39.2 % — SIGNIFICANT CHANGE UP (ref 39–50)
HGB BLD-MCNC: 13.4 G/DL — SIGNIFICANT CHANGE UP (ref 13–17)
MCHC RBC-ENTMCNC: 27.5 PG — SIGNIFICANT CHANGE UP (ref 27–34)
MCHC RBC-ENTMCNC: 34.2 % — SIGNIFICANT CHANGE UP (ref 32–36)
MCV RBC AUTO: 80.3 FL — SIGNIFICANT CHANGE UP (ref 80–100)
NRBC # FLD: 0 — SIGNIFICANT CHANGE UP
PHOSPHATE SERPL-MCNC: 3.7 MG/DL — SIGNIFICANT CHANGE UP (ref 2.5–4.5)
PLATELET # BLD AUTO: 178 K/UL — SIGNIFICANT CHANGE UP (ref 150–400)
PMV BLD: 11.9 FL — SIGNIFICANT CHANGE UP (ref 7–13)
POTASSIUM SERPL-MCNC: 3.7 MMOL/L — SIGNIFICANT CHANGE UP (ref 3.5–5.3)
POTASSIUM SERPL-SCNC: 3.7 MMOL/L — SIGNIFICANT CHANGE UP (ref 3.5–5.3)
PROT SERPL-MCNC: 6.5 G/DL — SIGNIFICANT CHANGE UP (ref 6–8.3)
RBC # BLD: 4.88 M/UL — SIGNIFICANT CHANGE UP (ref 4.2–5.8)
RBC # FLD: 13.4 % — SIGNIFICANT CHANGE UP (ref 10.3–14.5)
SODIUM SERPL-SCNC: 145 MMOL/L — SIGNIFICANT CHANGE UP (ref 135–145)
WBC # BLD: 8.97 K/UL — SIGNIFICANT CHANGE UP (ref 3.8–10.5)
WBC # FLD AUTO: 8.97 K/UL — SIGNIFICANT CHANGE UP (ref 3.8–10.5)

## 2018-08-06 RX ORDER — MORPHINE SULFATE 50 MG/1
4 CAPSULE, EXTENDED RELEASE ORAL EVERY 6 HOURS
Qty: 0 | Refills: 0 | Status: DISCONTINUED | OUTPATIENT
Start: 2018-08-06 | End: 2018-08-07

## 2018-08-06 RX ORDER — MORPHINE SULFATE 50 MG/1
2 CAPSULE, EXTENDED RELEASE ORAL ONCE
Qty: 0 | Refills: 0 | Status: DISCONTINUED | OUTPATIENT
Start: 2018-08-06 | End: 2018-08-06

## 2018-08-06 RX ORDER — GABAPENTIN 400 MG/1
300 CAPSULE ORAL
Qty: 0 | Refills: 0 | Status: DISCONTINUED | OUTPATIENT
Start: 2018-08-06 | End: 2018-08-07

## 2018-08-06 RX ADMIN — GABAPENTIN 200 MILLIGRAM(S): 400 CAPSULE ORAL at 05:20

## 2018-08-06 RX ADMIN — MORPHINE SULFATE 4 MILLIGRAM(S): 50 CAPSULE, EXTENDED RELEASE ORAL at 23:11

## 2018-08-06 RX ADMIN — MORPHINE SULFATE 4 MILLIGRAM(S): 50 CAPSULE, EXTENDED RELEASE ORAL at 23:24

## 2018-08-06 RX ADMIN — MORPHINE SULFATE 2 MILLIGRAM(S): 50 CAPSULE, EXTENDED RELEASE ORAL at 15:20

## 2018-08-06 RX ADMIN — MORPHINE SULFATE 2 MILLIGRAM(S): 50 CAPSULE, EXTENDED RELEASE ORAL at 05:49

## 2018-08-06 RX ADMIN — MORPHINE SULFATE 2 MILLIGRAM(S): 50 CAPSULE, EXTENDED RELEASE ORAL at 05:34

## 2018-08-06 RX ADMIN — MORPHINE SULFATE 2 MILLIGRAM(S): 50 CAPSULE, EXTENDED RELEASE ORAL at 11:01

## 2018-08-06 RX ADMIN — PANTOPRAZOLE SODIUM 40 MILLIGRAM(S): 20 TABLET, DELAYED RELEASE ORAL at 05:19

## 2018-08-06 RX ADMIN — SODIUM CHLORIDE 150 MILLILITER(S): 9 INJECTION, SOLUTION INTRAVENOUS at 14:03

## 2018-08-06 RX ADMIN — GABAPENTIN 300 MILLIGRAM(S): 400 CAPSULE ORAL at 17:49

## 2018-08-06 RX ADMIN — PANTOPRAZOLE SODIUM 40 MILLIGRAM(S): 20 TABLET, DELAYED RELEASE ORAL at 17:49

## 2018-08-06 RX ADMIN — MORPHINE SULFATE 2 MILLIGRAM(S): 50 CAPSULE, EXTENDED RELEASE ORAL at 15:01

## 2018-08-06 RX ADMIN — MORPHINE SULFATE 2 MILLIGRAM(S): 50 CAPSULE, EXTENDED RELEASE ORAL at 11:20

## 2018-08-06 RX ADMIN — SODIUM CHLORIDE 150 MILLILITER(S): 9 INJECTION, SOLUTION INTRAVENOUS at 05:20

## 2018-08-06 NOTE — PROGRESS NOTE ADULT - SUBJECTIVE AND OBJECTIVE BOX
INTERVAL HPI/OVERNIGHT EVENTS: I feel better .   Vital Signs Last 24 Hrs  T(C): 36.7 (06 Aug 2018 13:46), Max: 37.1 (06 Aug 2018 05:28)  T(F): 98.1 (06 Aug 2018 13:46), Max: 98.7 (06 Aug 2018 05:28)  HR: 68 (06 Aug 2018 13:46) (65 - 80)  BP: 124/88 (06 Aug 2018 13:46) (108/54 - 126/60)  BP(mean): --  RR: 18 (06 Aug 2018 13:46) (16 - 18)  SpO2: 100% (06 Aug 2018 13:46) (97% - 100%)  I&O's Summary    MEDICATIONS  (STANDING):  dextrose 5% + sodium chloride 0.9%. 1000 milliLiter(s) (150 mL/Hr) IV Continuous <Continuous>  gabapentin 300 milliGRAM(s) Oral two times a day  pantoprazole  Injectable 40 milliGRAM(s) IV Push two times a day    MEDICATIONS  (PRN):  morphine  - Injectable 4 milliGRAM(s) IV Push every 6 hours PRN Mod to Severe Pain    LABS:                        13.4   8.97  )-----------( 178      ( 06 Aug 2018 05:44 )             39.2     08-06    145  |  107  |  10  ----------------------------<  110<H>  3.7   |  23  |  0.87    Ca    8.7      06 Aug 2018 05:44  Phos  3.7     08-  Mg     1.8     08-    TPro  6.5  /  Alb  3.8  /  TBili  0.6  /  DBili  x   /  AST  19  /  ALT  14  /  AlkPhos  41  08-06      Urinalysis Basic - ( 05 Aug 2018 10:00 )    Color: YELLOW / Appearance: CLEAR / S.017 / pH: 8.5  Gluc: NEGATIVE / Ketone: TRACE  / Bili: NEGATIVE / Urobili: NORMAL mg/dL   Blood: NEGATIVE / Protein: 20 mg/dL / Nitrite: NEGATIVE   Leuk Esterase: NEGATIVE / RBC: 0-2 / WBC 2-5   Sq Epi: x / Non Sq Epi: x / Bacteria: x      CAPILLARY BLOOD GLUCOSE            Urinalysis Basic - ( 05 Aug 2018 10:00 )    Color: YELLOW / Appearance: CLEAR / S.017 / pH: 8.5  Gluc: NEGATIVE / Ketone: TRACE  / Bili: NEGATIVE / Urobili: NORMAL mg/dL   Blood: NEGATIVE / Protein: 20 mg/dL / Nitrite: NEGATIVE   Leuk Esterase: NEGATIVE / RBC: 0-2 / WBC 2-5   Sq Epi: x / Non Sq Epi: x / Bacteria: x      REVIEW OF SYSTEMS:  CONSTITUTIONAL: No fever, weight loss, or fatigue  EYES: No eye pain, visual disturbances, or discharge  ENMT:  No difficulty hearing, tinnitus, vertigo; No sinus or throat pain  NECK: No pain or stiffness  BREASTS: No pain, masses, or nipple discharge  RESPIRATORY: No cough, wheezing, chills or hemoptysis; No shortness of breath  CARDIOVASCULAR: No chest pain, palpitations, dizziness, or leg swelling  GASTROINTESTINAL: No abdominal or epigastric pain. No nausea, vomiting, or hematemesis; No diarrhea or constipation. No melena or hematochezia.  GENITOURINARY: No dysuria, frequency, hematuria, or incontinence  NEUROLOGICAL: No headaches, memory loss, loss of strength, numbness, or tremors    Consultant(s) Notes Reviewed:  [x ] YES  [ ] NO    PHYSICAL EXAM:  GENERAL: NAD, well-groomed, well-developed,not in any distress ,  HEAD:  Atraumatic, Normocephalic  EYES: EOMI, PERRLA, conjunctiva and sclera clear  ENMT: No tonsillar erythema, exudates, or enlargement; Moist mucous membranes, Good dentition, No lesions  NECK: Supple, No JVD, Normal thyroid  NERVOUS SYSTEM:  Alert & Oriented X3, No focal deficit   CHEST/LUNG: Good air entry bilateral with no  rales, rhonchi, wheezing, or rubs  HEART: Regular rate and rhythm; No murmurs, rubs, or gallops  ABDOMEN: Soft, Nontender, Nondistended; Bowel sounds present  EXTREMITIES:  2+ Peripheral Pulses, No clubbing, cyanosis, or edema  SKIN: No rashes or lesions    Care Discussed with Consultants/Other Providers [ x] YES  [ ] NO

## 2018-08-06 NOTE — PROGRESS NOTE ADULT - ASSESSMENT
28 year old M PMH intermittent abdominal pain presenting with nausea, vomiting and right lower abdominal pain. Patient previously noted to have "surgical complication" from appendectomy, but the appendectomy was done 10 years ago and sx have only started in the last year. He had an exploratory laparoscopy in 2017 for the sx that was negative. Patient is an every day marijuana user. Has had multiple CTs for same complaints without any findings. Denies diarrhea. Had normal BM yesterday. Denies fevers, chills. Reports pain radiates down to testicle. Denies any back pain.      Problem/Plan - 1:  ·  Problem: Nausea and vomiting, intractability of vomiting not specified, unspecified vomiting type.  Plan: Improving so advancing diet  and Zofran.      Problem/Plan - 2:  ·  Problem: Abdominal pain.  Plan: Increased the dose of Gabapentin. Had recently EGD as well Ct abdomen.      Problem/Plan - 3:  ·  Problem: GERD (gastroesophageal reflux disease) with severe Esophagitis .  Plan: Protonix 40mg bid for 4 to 6 weeks.

## 2018-08-07 ENCOUNTER — TRANSCRIPTION ENCOUNTER (OUTPATIENT)
Age: 28
End: 2018-08-07

## 2018-08-07 ENCOUNTER — APPOINTMENT (OUTPATIENT)
Dept: INTERNAL MEDICINE | Facility: CLINIC | Age: 28
End: 2018-08-07

## 2018-08-07 VITALS
DIASTOLIC BLOOD PRESSURE: 62 MMHG | RESPIRATION RATE: 18 BRPM | HEART RATE: 62 BPM | OXYGEN SATURATION: 99 % | SYSTOLIC BLOOD PRESSURE: 110 MMHG | TEMPERATURE: 98 F

## 2018-08-07 RX ADMIN — GABAPENTIN 300 MILLIGRAM(S): 400 CAPSULE ORAL at 17:12

## 2018-08-07 RX ADMIN — MORPHINE SULFATE 4 MILLIGRAM(S): 50 CAPSULE, EXTENDED RELEASE ORAL at 12:13

## 2018-08-07 RX ADMIN — PANTOPRAZOLE SODIUM 40 MILLIGRAM(S): 20 TABLET, DELAYED RELEASE ORAL at 06:05

## 2018-08-07 RX ADMIN — MORPHINE SULFATE 4 MILLIGRAM(S): 50 CAPSULE, EXTENDED RELEASE ORAL at 18:21

## 2018-08-07 RX ADMIN — MORPHINE SULFATE 4 MILLIGRAM(S): 50 CAPSULE, EXTENDED RELEASE ORAL at 12:45

## 2018-08-07 RX ADMIN — GABAPENTIN 300 MILLIGRAM(S): 400 CAPSULE ORAL at 06:06

## 2018-08-07 RX ADMIN — PANTOPRAZOLE SODIUM 40 MILLIGRAM(S): 20 TABLET, DELAYED RELEASE ORAL at 17:12

## 2018-08-07 RX ADMIN — MORPHINE SULFATE 4 MILLIGRAM(S): 50 CAPSULE, EXTENDED RELEASE ORAL at 06:20

## 2018-08-07 RX ADMIN — MORPHINE SULFATE 4 MILLIGRAM(S): 50 CAPSULE, EXTENDED RELEASE ORAL at 06:05

## 2018-08-07 NOTE — DISCHARGE NOTE ADULT - PLAN OF CARE
Resolved Continue regular diet as tolerated.    Eat small, frequent meals.  If vomiting continues, go to emergency room.   Follow up with your PCP within 1 week of discharge. Continue Protonix as prescribed.

## 2018-08-07 NOTE — DISCHARGE NOTE ADULT - CARE PLAN
Principal Discharge DX:	Nausea and vomiting, intractability of vomiting not specified, unspecified vomiting type  Goal:	Resolved  Assessment and plan of treatment:	Continue regular diet as tolerated.    Eat small, frequent meals.  If vomiting continues, go to emergency room.   Follow up with your PCP within 1 week of discharge.  Secondary Diagnosis:	GERD (gastroesophageal reflux disease)  Assessment and plan of treatment:	Continue Protonix as prescribed.

## 2018-08-07 NOTE — DISCHARGE NOTE ADULT - HOSPITAL COURSE
28 year old M PMH Marijuana / opiate use, intermittent abdominal pain presenting with nausea, vomiting and right lower abdominal pain.    Hospital course:    Nausea and vomiting  - IVF, Clear liquid   -tolerating regular diet    Abdominal pain  - Pain meds   - Had recently EGD as well CT abdomen in June   - House GI consulted-no further workup inpatient    GERD   - PPI-continue Protonix    Dispo- stable for discharge.  Follow up with Gastroenterologist within 2 weeks of discharge.

## 2018-08-07 NOTE — PROGRESS NOTE ADULT - ASSESSMENT
28 year old M PMH intermittent abdominal pain presenting with nausea, vomiting and right lower abdominal pain. Patient previously noted to have "surgical complication" from appendectomy, but the appendectomy was done 10 years ago and sx have only started in the last year. He had an exploratory laparoscopy in 2017 for the sx that was negative. Patient is an every day marijuana user. Has had multiple CTs for same complaints without any findings. Denies diarrhea. Had normal BM yesterday. Denies fevers, chills. Reports pain radiates down to testicle. Denies any back pain.      Problem/Plan - 1:  ·  Problem: Nausea and vomiting, intractability of vomiting not specified, unspecified vomiting type.  Plan: Improving so advancing diet to regular today  and continuing  Zofran PRN .      Problem/Plan - 2:  ·  Problem: Abdominal pain.  Plan: Increased the dose of Gabapentin to 300mg BID . Recent EGD and CT scans were normal.      Problem/Plan - 3:  ·  Problem: GERD (gastroesophageal reflux disease) with severe Esophagitis .  Plan: Protonix 40mg bid for 4 to 6 weeks.     Disposition : DC planning.

## 2018-08-07 NOTE — PROGRESS NOTE ADULT - SUBJECTIVE AND OBJECTIVE BOX
INTERVAL HPI/OVERNIGHT EVENTS: I feel better and had BM.   Vital Signs Last 24 Hrs  T(C): 36.6 (07 Aug 2018 14:22), Max: 37.1 (06 Aug 2018 22:27)  T(F): 97.9 (07 Aug 2018 14:22), Max: 98.8 (06 Aug 2018 22:27)  HR: 62 (07 Aug 2018 14:22) (62 - 96)  BP: 110/62 (07 Aug 2018 14:22) (110/62 - 125/70)  BP(mean): --  RR: 18 (07 Aug 2018 14:22) (18 - 18)  SpO2: 99% (07 Aug 2018 14:22) (96% - 99%)  I&O's Summary    MEDICATIONS  (STANDING):  dextrose 5% + sodium chloride 0.9%. 1000 milliLiter(s) (150 mL/Hr) IV Continuous <Continuous>  gabapentin 300 milliGRAM(s) Oral two times a day  pantoprazole  Injectable 40 milliGRAM(s) IV Push two times a day    MEDICATIONS  (PRN):  morphine  - Injectable 4 milliGRAM(s) IV Push every 6 hours PRN Mod to Severe Pain    LABS:                        13.4   8.97  )-----------( 178      ( 06 Aug 2018 05:44 )             39.2     08-06    145  |  107  |  10  ----------------------------<  110<H>  3.7   |  23  |  0.87    Ca    8.7      06 Aug 2018 05:44  Phos  3.7     08-06    TPro  6.5  /  Alb  3.8  /  TBili  0.6  /  DBili  x   /  AST  19  /  ALT  14  /  AlkPhos  41  08-06        CAPILLARY BLOOD GLUCOSE              REVIEW OF SYSTEMS:  CONSTITUTIONAL: No fever, weight loss, or fatigue  EYES: No eye pain, visual disturbances, or discharge  ENMT:  No difficulty hearing, tinnitus, vertigo; No sinus or throat pain  NECK: No pain or stiffness  BREASTS: No pain, masses, or nipple discharge  RESPIRATORY: No cough, wheezing, chills or hemoptysis; No shortness of breath  CARDIOVASCULAR: No chest pain, palpitations, dizziness, or leg swelling  GASTROINTESTINAL: No abdominal or epigastric pain. No nausea, vomiting, or hematemesis; No diarrhea or constipation. No melena or hematochezia.  GENITOURINARY: No dysuria, frequency, hematuria, or incontinence  NEUROLOGICAL: No headaches, memory loss, loss of strength, numbness, or tremors  SKIN: No itching, burning, rashes, or lesions   LYMPH NODES: No enlarged glands  ENDOCRINE: No heat or cold intolerance; No hair loss  MUSCULOSKELETAL: No joint pain or swelling; No muscle, back, or extremity pain  PSYCHIATRIC: No depression, anxiety, mood swings, or difficulty sleeping  HEME/LYMPH: No easy bruising, or bleeding gums  ALLERY AND IMMUNOLOGIC: No hives or eczema    RADIOLOGY & ADDITIONAL TESTS:    Consultant(s) Notes Reviewed:  [x ] YES  [ ] NO    PHYSICAL EXAM:  GENERAL: NAD, well-groomed, well-developed,not in any distress ,  HEAD:  Atraumatic, Normocephalic  EYES: EOMI, PERRLA, conjunctiva and sclera clear  ENMT: No tonsillar erythema, exudates, or enlargement; Moist mucous membranes, Good dentition, No lesions  NECK: Supple, No JVD, Normal thyroid  NERVOUS SYSTEM:  Alert & Oriented X3, No focal deficit   CHEST/LUNG: Good air entry bilateral with no  rales, rhonchi, wheezing, or rubs  HEART: Regular rate and rhythm; No murmurs, rubs, or gallops  ABDOMEN: Soft, Nontender, Nondistended; Bowel sounds present  EXTREMITIES:  2+ Peripheral Pulses, No clubbing, cyanosis, or edema  SKIN: No rashes or lesions    Care Discussed with Consultants/Other Providers [ x] YES  [ ] NO

## 2018-08-07 NOTE — DISCHARGE NOTE ADULT - MEDICATION SUMMARY - MEDICATIONS TO TAKE
I will START or STAY ON the medications listed below when I get home from the hospital:    gabapentin 100 mg oral capsule  -- 2 cap(s) by mouth 2 times a day  -- Indication: For prophylactic    Protonix 40 mg oral delayed release tablet  -- 1 tab(s) by mouth once a day   -- Indication: For GERD (gastroesophageal reflux disease)

## 2018-08-07 NOTE — DISCHARGE NOTE ADULT - PATIENT PORTAL LINK FT
You can access the [facility] Patient Portal, offered by [facility], by registering with the following website: [email or web address]

## 2018-08-08 ENCOUNTER — MEDICATION RENEWAL (OUTPATIENT)
Age: 28
End: 2018-08-08

## 2018-08-09 NOTE — DISCUSSION/SUMMARY
[Home] : patient was discharged to home [Follow Up Appt with Provider within 7 days] : follow up appt with provider within 7 days [FreeTextEntry1] : Spoke to patient briefly, he declined to discuss his hospitalization at this time. He stated he will discuss with doctor in detail during visit. He has appointment scheduled for 8/9/18.

## 2018-08-13 ENCOUNTER — RX RENEWAL (OUTPATIENT)
Age: 28
End: 2018-08-13

## 2018-08-13 ENCOUNTER — APPOINTMENT (OUTPATIENT)
Dept: INTERNAL MEDICINE | Facility: CLINIC | Age: 28
End: 2018-08-13
Payer: COMMERCIAL

## 2018-08-13 VITALS
WEIGHT: 153 LBS | DIASTOLIC BLOOD PRESSURE: 78 MMHG | SYSTOLIC BLOOD PRESSURE: 110 MMHG | BODY MASS INDEX: 20.72 KG/M2 | HEIGHT: 72 IN

## 2018-08-13 DIAGNOSIS — Z87.09 PERSONAL HISTORY OF OTHER DISEASES OF THE RESPIRATORY SYSTEM: ICD-10-CM

## 2018-08-13 DIAGNOSIS — Z12.11 ENCOUNTER FOR SCREENING FOR MALIGNANT NEOPLASM OF COLON: ICD-10-CM

## 2018-08-13 DIAGNOSIS — R10.31 RIGHT LOWER QUADRANT PAIN: ICD-10-CM

## 2018-08-13 DIAGNOSIS — R93.8 ABNORMAL FINDINGS ON DIAGNOSTIC IMAGING OF OTHER SPECIFIED BODY STRUCTURES: ICD-10-CM

## 2018-08-13 DIAGNOSIS — J06.9 ACUTE UPPER RESPIRATORY INFECTION, UNSPECIFIED: ICD-10-CM

## 2018-08-13 PROCEDURE — 99496 TRANSJ CARE MGMT HIGH F2F 7D: CPT

## 2018-08-13 RX ORDER — ALBUTEROL SULFATE 90 UG/1
108 (90 BASE) AEROSOL, METERED RESPIRATORY (INHALATION) EVERY 4 HOURS
Qty: 1 | Refills: 0 | Status: DISCONTINUED | COMMUNITY
Start: 2018-03-30 | End: 2018-08-13

## 2018-08-13 NOTE — HISTORY OF PRESENT ILLNESS
[Post-hospitalization from ___ Hospital] : Post-hospitalization from [unfilled] Hospital [Admitted on: ___] : The patient was admitted on [unfilled] [Discharged on ___] : discharged on [unfilled] [Discharge Summary] : discharge summary [Pertinent Labs] : pertinent labs [Discharge Med List] : discharge medication list [Patient Contacted By: ____] : and contacted by [unfilled] [FreeTextEntry2] : Admitted with abd pain and nausea with vomiting. Testing done at hospital did not elucidate etiology of pain. He was discharged home with instructions to follow up with GI. He feels better at home now. Advised to stop marijuana use.

## 2018-08-13 NOTE — ASSESSMENT
[FreeTextEntry1] : \par Advised follow-up with GI since etiology of abd pain not elucidated during hospital admission.\par \par I advised patient to stop all illicit drug use including marijuana.\par \par Cont PPI to GERD symptoms, nausea/vomiting which have improved.

## 2018-08-13 NOTE — REVIEW OF SYSTEMS
[Abdominal Pain] : abdominal pain [Vomiting] : vomiting [Negative] : Heme/Lymph [FreeTextEntry7] : see hpi

## 2018-08-16 LAB
ALBUMIN SERPL ELPH-MCNC: 5.1 G/DL
ALP BLD-CCNC: 54 U/L
ALT SERPL-CCNC: 15 U/L
ANION GAP SERPL CALC-SCNC: 16 MMOL/L
APPEARANCE: CLEAR
AST SERPL-CCNC: 19 U/L
BACTERIA THROAT CULT: NORMAL
BACTERIA: NEGATIVE
BASOPHILS # BLD AUTO: 0.05 K/UL
BASOPHILS NFR BLD AUTO: 0.8 %
BILIRUB SERPL-MCNC: 1 MG/DL
BILIRUBIN URINE: NEGATIVE
BLOOD URINE: NEGATIVE
BUN SERPL-MCNC: 12 MG/DL
CALCIUM SERPL-MCNC: 10.3 MG/DL
CHLORIDE SERPL-SCNC: 100 MMOL/L
CHOLEST SERPL-MCNC: 144 MG/DL
CHOLEST/HDLC SERPL: 2.3 RATIO
CO2 SERPL-SCNC: 24 MMOL/L
COLOR: ABNORMAL
CREAT SERPL-MCNC: 1.47 MG/DL
CRP SERPL-MCNC: 0.14 MG/DL
EOSINOPHIL # BLD AUTO: 0.17 K/UL
EOSINOPHIL NFR BLD AUTO: 2.6 %
ERYTHROCYTE [SEDIMENTATION RATE] IN BLOOD BY WESTERGREN METHOD: 12 MM/HR
GLUCOSE QUALITATIVE U: NEGATIVE MG/DL
GLUCOSE SERPL-MCNC: 99 MG/DL
HAV IGM SER QL: NONREACTIVE
HBA1C MFR BLD HPLC: 5.2 %
HBV CORE IGM SER QL: NONREACTIVE
HBV SURFACE AG SER QL: NONREACTIVE
HCT VFR BLD CALC: 49 %
HCV AB SER QL: NONREACTIVE
HCV S/CO RATIO: 0.12 S/CO
HDLC SERPL-MCNC: 62 MG/DL
HGB BLD-MCNC: 16.3 G/DL
HIV1+2 AB SPEC QL IA.RAPID: NONREACTIVE
HYALINE CASTS: 0 /LPF
IMM GRANULOCYTES NFR BLD AUTO: 0.3 %
KETONES URINE: NEGATIVE
LDLC SERPL CALC-MCNC: 71 MG/DL
LEUKOCYTE ESTERASE URINE: NEGATIVE
LYMPHOCYTES # BLD AUTO: 1.83 K/UL
LYMPHOCYTES NFR BLD AUTO: 27.7 %
MAN DIFF?: NORMAL
MCHC RBC-ENTMCNC: 27.2 PG
MCHC RBC-ENTMCNC: 33.3 GM/DL
MCV RBC AUTO: 81.7 FL
MICROSCOPIC-UA: NORMAL
MONOCYTES # BLD AUTO: 0.8 K/UL
MONOCYTES NFR BLD AUTO: 12.1 %
NEUTROPHILS # BLD AUTO: 3.73 K/UL
NEUTROPHILS NFR BLD AUTO: 56.5 %
NITRITE URINE: NEGATIVE
PH URINE: 6.5
PLATELET # BLD AUTO: 255 K/UL
POTASSIUM SERPL-SCNC: 4.4 MMOL/L
PROT SERPL-MCNC: 8.4 G/DL
PROTEIN URINE: NEGATIVE MG/DL
RBC # BLD: 6 M/UL
RBC # FLD: 14.4 %
RED BLOOD CELLS URINE: 3 /HPF
SODIUM SERPL-SCNC: 140 MMOL/L
SPECIFIC GRAVITY URINE: 1.03
SQUAMOUS EPITHELIAL CELLS: 0 /HPF
TRIGL SERPL-MCNC: 54 MG/DL
TSH SERPL-ACNC: 0.91 UIU/ML
UROBILINOGEN URINE: NEGATIVE MG/DL
WBC # FLD AUTO: 6.6 K/UL
WHITE BLOOD CELLS URINE: 2 /HPF

## 2018-08-21 ENCOUNTER — RX RENEWAL (OUTPATIENT)
Age: 28
End: 2018-08-21

## 2018-08-29 ENCOUNTER — APPOINTMENT (OUTPATIENT)
Dept: INTERNAL MEDICINE | Facility: CLINIC | Age: 28
End: 2018-08-29
Payer: COMMERCIAL

## 2018-08-29 VITALS
DIASTOLIC BLOOD PRESSURE: 62 MMHG | TEMPERATURE: 98 F | HEIGHT: 72 IN | HEART RATE: 62 BPM | BODY MASS INDEX: 21.26 KG/M2 | WEIGHT: 157 LBS | SYSTOLIC BLOOD PRESSURE: 110 MMHG | OXYGEN SATURATION: 99 %

## 2018-08-29 PROCEDURE — 99213 OFFICE O/P EST LOW 20 MIN: CPT

## 2018-08-29 RX ORDER — MELOXICAM 15 MG/1
15 TABLET ORAL
Qty: 30 | Refills: 0 | Status: COMPLETED | COMMUNITY
Start: 2018-08-29 | End: 2018-09-28

## 2018-08-29 NOTE — HISTORY OF PRESENT ILLNESS
[Sore Throat] : sore throat [FreeTextEntry8] : 27 yo adm 5Aug D/C 7 Aug nausea vomiting unknown cause. Advised D/C marijuana use\par Placed on PPI\par \par Decided nerve pain triggered his vomiting  was off BIBI for a while and thinks that's why he was sick\par 24Aug Friday sweating, myalgia, chest congestion, nonsmoker  Today the 1st\par Had HA, no cough, eyes red, fatigue, extreme myalgia  so diaphoretic. No fever ++ chills\par Decreased appetite\par No sore thorat\par \par SH: no sick contacts no one sick [employer]\par Not sexually active not relations nor kissing\par No pets at home\par No travel outside of country\par

## 2018-08-30 LAB
BASOPHILS # BLD AUTO: 0.05 K/UL
BASOPHILS NFR BLD AUTO: 0.6 %
EOSINOPHIL # BLD AUTO: 0.1 K/UL
EOSINOPHIL NFR BLD AUTO: 1.2 %
HCT VFR BLD CALC: 45.8 %
HETEROPH AB SER QL: NEGATIVE
HGB BLD-MCNC: 14.9 G/DL
IMM GRANULOCYTES NFR BLD AUTO: 0.2 %
LYMPHOCYTES # BLD AUTO: 2.46 K/UL
LYMPHOCYTES NFR BLD AUTO: 28.7 %
MAN DIFF?: NORMAL
MCHC RBC-ENTMCNC: 26.9 PG
MCHC RBC-ENTMCNC: 32.5 GM/DL
MCV RBC AUTO: 82.8 FL
MONOCYTES # BLD AUTO: 0.75 K/UL
MONOCYTES NFR BLD AUTO: 8.7 %
NEUTROPHILS # BLD AUTO: 5.2 K/UL
NEUTROPHILS NFR BLD AUTO: 60.6 %
PLATELET # BLD AUTO: 277 K/UL
RAPID RVP RESULT: NOT DETECTED
RBC # BLD: 5.53 M/UL
RBC # FLD: 14.5 %
WBC # FLD AUTO: 8.58 K/UL

## 2018-09-17 ENCOUNTER — APPOINTMENT (OUTPATIENT)
Dept: INTERNAL MEDICINE | Facility: CLINIC | Age: 28
End: 2018-09-17
Payer: COMMERCIAL

## 2018-09-17 VITALS
SYSTOLIC BLOOD PRESSURE: 90 MMHG | TEMPERATURE: 98 F | HEIGHT: 72 IN | HEART RATE: 59 BPM | DIASTOLIC BLOOD PRESSURE: 58 MMHG | BODY MASS INDEX: 21.94 KG/M2 | OXYGEN SATURATION: 99 % | WEIGHT: 162 LBS

## 2018-09-17 DIAGNOSIS — B30.9 VIRAL CONJUNCTIVITIS, UNSPECIFIED: ICD-10-CM

## 2018-09-17 DIAGNOSIS — Z87.898 PERSONAL HISTORY OF OTHER SPECIFIED CONDITIONS: ICD-10-CM

## 2018-09-17 DIAGNOSIS — Z87.09 PERSONAL HISTORY OF OTHER DISEASES OF THE RESPIRATORY SYSTEM: ICD-10-CM

## 2018-09-17 DIAGNOSIS — Z86.19 PERSONAL HISTORY OF OTHER INFECTIOUS AND PARASITIC DISEASES: ICD-10-CM

## 2018-09-17 DIAGNOSIS — Z87.19 PERSONAL HISTORY OF OTHER DISEASES OF THE DIGESTIVE SYSTEM: ICD-10-CM

## 2018-09-17 PROCEDURE — 99214 OFFICE O/P EST MOD 30 MIN: CPT

## 2018-09-17 RX ORDER — CIPROFLOXACIN 3 MG/ML
0.3 SOLUTION OPHTHALMIC
Qty: 1 | Refills: 0 | Status: DISCONTINUED | COMMUNITY
Start: 2018-08-29 | End: 2018-09-17

## 2018-09-17 NOTE — ASSESSMENT
[FreeTextEntry1] : \par Labs drawn in Aug 2018 ok. He notes improvement in viral symptoms from last visit. Continues to experience neuropathic pain, controlled with Gabapentin.

## 2018-09-17 NOTE — HISTORY OF PRESENT ILLNESS
[de-identified] : Presents for follow-up of viral symptoms from last visit, now much improved. He denies any more nausea/vomiting but continues to have pain related to neuropathy, controlled with Gabapentin.

## 2018-09-17 NOTE — REVIEW OF SYSTEMS
[Negative] : Heme/Lymph [Nausea] : no nausea [Constipation] : no constipation [Diarrhea] : no diarrhea [Vomiting] : no vomiting [Heartburn] : no heartburn [Melena] : no melena [FreeTextEntry7] : see hpi

## 2018-09-20 ENCOUNTER — APPOINTMENT (OUTPATIENT)
Dept: INTERNAL MEDICINE | Facility: CLINIC | Age: 28
End: 2018-09-20

## 2018-10-10 LAB
25(OH)D3 SERPL-MCNC: 19.1 NG/ML
ALBUMIN SERPL ELPH-MCNC: 4.1 G/DL
ALP BLD-CCNC: 57 U/L
ALT SERPL-CCNC: 23 U/L
ANION GAP SERPL CALC-SCNC: 10 MMOL/L
APPEARANCE: CLEAR
AST SERPL-CCNC: 20 U/L
BACTERIA THROAT CULT: NORMAL
BACTERIA: NEGATIVE
BASOPHILS # BLD AUTO: 0.05 K/UL
BASOPHILS NFR BLD AUTO: 1 %
BILIRUB SERPL-MCNC: 0.4 MG/DL
BILIRUBIN URINE: NEGATIVE
BLOOD URINE: NEGATIVE
BUN SERPL-MCNC: 11 MG/DL
CALCIUM SERPL-MCNC: 9.1 MG/DL
CHLORIDE SERPL-SCNC: 106 MMOL/L
CHOLEST SERPL-MCNC: 142 MG/DL
CHOLEST/HDLC SERPL: 2.6 RATIO
CO2 SERPL-SCNC: 26 MMOL/L
COLOR: YELLOW
CREAT SERPL-MCNC: 0.96 MG/DL
EOSINOPHIL # BLD AUTO: 0.19 K/UL
EOSINOPHIL NFR BLD AUTO: 3.7 %
GLUCOSE QUALITATIVE U: NEGATIVE MG/DL
GLUCOSE SERPL-MCNC: 91 MG/DL
HBA1C MFR BLD HPLC: 5 %
HCT VFR BLD CALC: 42.1 %
HDLC SERPL-MCNC: 55 MG/DL
HGB BLD-MCNC: 14.3 G/DL
HIV1+2 AB SPEC QL IA.RAPID: NONREACTIVE
IMM GRANULOCYTES NFR BLD AUTO: 0.2 %
KETONES URINE: NEGATIVE
LDLC SERPL CALC-MCNC: 76 MG/DL
LEUKOCYTE ESTERASE URINE: NEGATIVE
LYMPHOCYTES # BLD AUTO: 1.59 K/UL
LYMPHOCYTES NFR BLD AUTO: 31.2 %
MAN DIFF?: NORMAL
MCHC RBC-ENTMCNC: 27.6 PG
MCHC RBC-ENTMCNC: 34 GM/DL
MCV RBC AUTO: 81.1 FL
MICROSCOPIC-UA: NORMAL
MONOCYTES # BLD AUTO: 0.45 K/UL
MONOCYTES NFR BLD AUTO: 8.8 %
NEUTROPHILS # BLD AUTO: 2.8 K/UL
NEUTROPHILS NFR BLD AUTO: 55.1 %
NITRITE URINE: NEGATIVE
PH URINE: 6
PLATELET # BLD AUTO: 221 K/UL
POTASSIUM SERPL-SCNC: 4.4 MMOL/L
PROT SERPL-MCNC: 7.2 G/DL
PROTEIN URINE: NEGATIVE MG/DL
RBC # BLD: 5.19 M/UL
RBC # FLD: 14.2 %
RED BLOOD CELLS URINE: 7 /HPF
SODIUM SERPL-SCNC: 142 MMOL/L
SPECIFIC GRAVITY URINE: 1.02
SQUAMOUS EPITHELIAL CELLS: 0 /HPF
TRIGL SERPL-MCNC: 53 MG/DL
TSH SERPL-ACNC: 0.82 UIU/ML
UROBILINOGEN URINE: NEGATIVE MG/DL
VIT B12 SERPL-MCNC: 362 PG/ML
WBC # FLD AUTO: 5.09 K/UL
WHITE BLOOD CELLS URINE: 1 /HPF

## 2018-11-24 NOTE — ED PROVIDER NOTE - OBJECTIVE STATEMENT
27 year old male with hx of chronic right sided abdominal pain, appendectomy in 2004 presents with nausea and vomiting. Per sister, patient has been acting very strange recently. Patient was recently here at [facility] last Thursday for uncontrollable vomiting and pain. Per sister, patient seem to have dystonic reaction from the anasthesia given when having and endoscopy. She notes that patient became increasingly nauseous and started vomiting. Patient has become more aggressive, talks loudly, and has been acting strange. Per sister, patient went to [place] for a car related but ended up at his family's practice and told his mother "God brought me here." He acted like he was a new patient and filled our patient forms and "threw his AMEX card to the  as if he was paying for his visit"  She also states he threw out something from the office into the trash can and covered it because he did not like it. She also notes he had admitted to her that he hears voices. Patient reports that he has never been hospitalized on a psychiatric unit.  He states that he saw some chemical on the [facility] that was why he was brought to the hospital.  He denies that /VH 27 year old male with hx of chronic right sided abdominal pain, appendectomy in 2004 presents with nausea and vomiting. Per sister, patient has been acting very strange recently. Patient was recently here at [facility] last Thursday for uncontrollable vomiting and pain. Per sister, patient seem to have dystonic reaction from the anasthesia given when having and endoscopy. She notes that patient became increasingly nauseous and started vomiting. Patient has become more aggressive, talks loudly, and has been acting strange. Per sister, patient went to [place] for a car related but ended up at his family's practice and told his mother "God brought me here." He acted like he was a new patient and filled our patient forms and "threw his AMEX card to the  as if he was paying for his visit"  She also states he threw out something from the office into the trash can and covered it because he did not like it. She also notes he had admitted to her that he hears voices. Patient also admits that he has been drinking a lot of water and has been trying to lose weight.   Pain Control- Dr. Ruben Rubin 27 year old male with hx of chronic right sided abdominal pain, appendectomy in 2004 presents with nausea and vomiting. This is a chronic problem per patient and his family.  Per sister, the real reason patient was brought in is because patient has been acting very strange recently. Patient was recently here at [facility] last Thursday for uncontrollable vomiting and pain. Per sister, patient seem to have dystonic reaction from the anasthesia given when having and endoscopy. She notes that patient became increasingly nauseous and started vomiting. Patient has become more aggressive, talks loudly, and has been acting strange. Per sister, patient went to [place] for a car related but ended up at his family's practice and told his mother "God brought me here." He acted like he was a new patient and filled our patient forms and "threw his AMEX card to the  as if he was paying for his visit"  She also states he threw out something from the office into the trash can and covered it because he did not like it. She also notes he had admitted to her that he hears voices. Patient also admits that he has been drinking a lot of water and has been trying to lose weight.   Pain Control- Dr. Ruben Rubin Patient is a 30 years old AA male, single, domiciled with family in [place], on SSD for neurofibroma, medical history of asthma, no prior psychiatric history, no prior SA/SIB, [place], denied h/o substance use, no known seizure d/o.      Patient is seen for new admission follow up.  He reports that he has never been hospitalized on a psychiatric unit.   He can't elaborate how and why he was brought to the hospital.  Further quested, he states that he [place].  When details inquired, he got somewhat irritable stating "How do I know?  I am not the one who make the chemical!"  He [place].  He denies AH/VH, denies delusions of paranoia, reference or persecutory.  His mood is "fine".  He denies history of depressed or elevated mood.  He states that he doesn't have mental illness.  He said he has been attending groups and social with pees.  He doesn't need to be on the unit but if his mother wants him to stay, he will.      Consented by patient, spoke with mother Ms. Nam [phone].  She confirms that patient has no psychiatric history and no known substance use.  She reports that patient started to act differently for one week.  He states that people in the house whisper about him.  He thought that relatives were in the house when they weren't.  He misplaced his keys and blames his brother for taking them.  He didn't sleep well, walking in and out of his room.  Talking fast is his baseline.      Labs was significant for low Hb, elevated liver enzymes.  Medicine consulted, workups ordered and will follow up.

## 2019-01-17 ENCOUNTER — MEDICATION RENEWAL (OUTPATIENT)
Age: 29
End: 2019-01-17

## 2019-01-18 ENCOUNTER — EMERGENCY (EMERGENCY)
Facility: HOSPITAL | Age: 29
LOS: 1 days | Discharge: HOME | End: 2019-01-18
Attending: EMERGENCY MEDICINE
Payer: COMMERCIAL

## 2019-01-18 VITALS
WEIGHT: 160.06 LBS | RESPIRATION RATE: 16 BRPM | TEMPERATURE: 98 F | HEART RATE: 78 BPM | SYSTOLIC BLOOD PRESSURE: 136 MMHG | OXYGEN SATURATION: 97 % | DIASTOLIC BLOOD PRESSURE: 82 MMHG | HEIGHT: 72 IN

## 2019-01-18 DIAGNOSIS — Z90.49 ACQUIRED ABSENCE OF OTHER SPECIFIED PARTS OF DIGESTIVE TRACT: Chronic | ICD-10-CM

## 2019-01-18 PROCEDURE — 99282 EMERGENCY DEPT VISIT SF MDM: CPT

## 2019-01-18 NOTE — ED PROVIDER NOTE - ATTENDING CONTRIBUTION TO CARE
Attending MD Moreno: I personally have seen and examined this patient.  Resident note reviewed and agree on plan of care and except where noted.  See below for details.     Seen in [facility], accompanied by sister    28M with GERD presents to the ED with facial/head trauma this AM.  Reports at around 9am was closing the gate after taking the car out when the gate swung out and hit patient on the L side of the face.  Reports hit in his L temple and L zygoma.  Denies dizziness, weakness, sensory changes.  Denies LOC.   Denies change in vision, double vision, sudden loss of vision. Denies change in hearing, tinnitus.  Denies numbness/weakness/tingling in extremities.  On exam, NAD, CN 2-12 grossly intact, head NCAT, PERRL, FROM at neck, moving all extremities with 5/5 strength bilateral upper and lower extremities, good and equal  strength bilaterally, sensory grossly intact, ambulating with steady gait.  A/P: 28M s/p facial/head trauma, neuro intact, reassurance provided. Stable for discharge. Follow up instructions given, importance of follow up emphasized, return to ED parameters reviewed and patient verbalized understanding.  All questions answered, all concerns addressed.

## 2019-01-18 NOTE — ED PROVIDER NOTE - NS ED ROS FT
GENERAL: No fever, chills  EYES: no vision changes, no discharge.   HEENT: no difficulty swallowing or speaking   CARDIAC: no chest pain/pressure, SOB, lower ex edema  PULMONARY: no cough, SOB  GI: no abdominal pain, n/v/d/c  : no dysuria, frequency, hematuria  SKIN: no rashes, lesions, vesicles  NEURO: no headache, lightheadedness, paraesthesias.   MSK: No joint pain. GENERAL: No fever, chills  EYES: no vision changes, no discharge.   HEENT: no difficulty swallowing or speaking   CARDIAC: no chest pain/pressure, SOB, lower ex edema  PULMONARY: no cough, SOB  GI: no abdominal pain, n/v/d/c  : no dysuria, frequency, hematuria  SKIN: + ecchymosis on L temple and left zygoma. no rashes, lesions, vesicles  NEURO: no headache, lightheadedness, paraesthesias.   MSK: No joint pain.

## 2019-01-18 NOTE — ED ADULT NURSE NOTE - OBJECTIVE STATEMENT
28YOM with no significant pmh presents to ED via walking c/o left sided head injury. Pt states a metal door swung due to wind and hit left side of his head. Pt states he felt dizzy for couple seconds but no LOC or fall. Pt denies numbness, tingling, blurry vision, HA, dizziness, fever, chills, N/V/D, back pain, abdominal pain, burning upon urination. Steady gait noted upon arrival. A&Ox3. Comfort and safety measures provided. Will continue to monitor.

## 2019-01-18 NOTE — ED PROVIDER NOTE - PROGRESS NOTE DETAILS
Torrie Ramirez M.D. Resident  Normal neurological exam findings explained to pt. Strict return precautions given. Patient and pt's sister verbalized understanding of return precautions and agreed with plan to not pursue CT head and follow up outpatient with Dr. Ching.

## 2019-01-18 NOTE — ED PROVIDER NOTE - CARE PROVIDER_API CALL
Puma Ching), Medicine  [address]  [address] [place] [id number]  Phone: [phone]  Fax: [phone]

## 2019-01-18 NOTE — ED PROVIDER NOTE - NSFOLLOWUPINSTRUCTIONS_ED_ALL_ED_FT
You were seen in the Emergency Department (ED) for injury to your face and head.     Please take over the counter Tylenol or Ibuprofen as directed by the packaging insert for your pain.   Please follow up with your primary care doctor in the next 72 hour    Please return to the ED if you experience any new or concerning symptoms, including, but not limited to: persistent, or worsening pain, change in your vision, changes in your mental status or confusion, change in ability to move or feel part of your body, chest pain, difficulty breathing, fever, chills.     Thank you for choosing a [facility] ED.

## 2019-01-18 NOTE — ED PROVIDER NOTE - OBJECTIVE STATEMENT
29 yo male with no sig pmhx presents s/p hitting L zygomatic arch and L temple with gate at 9AM. Pain is worse with palpation, minor ecchymosis on left zygoma and temple. Pt denies LOC, changes in vision, mental status, or focal neurological findings. Pt endorse feeling at his baseline. Present to ED because pain only mildly relieved on Tylenol and ice.

## 2019-01-18 NOTE — ED PROVIDER NOTE - MEDICAL DECISION MAKING DETAILS
29 yo male with no sig pmhx, presents with face and temple pain, 29 yo male with no sig pmhx, presents with face and temple pain s/p being it in the face by a metal gate. Neuro exam benign, Mild tender to palpation in areas of ecchymosis, EOMI. low suspicion for intracranial process. D/C home with strict return precautions and follow up with PMD in 3 days.

## 2019-01-22 LAB — BACTERIA THROAT CULT: NORMAL

## 2019-01-26 ENCOUNTER — TRANSCRIPTION ENCOUNTER (OUTPATIENT)
Age: 29
End: 2019-01-26

## 2019-04-17 NOTE — ED ADULT TRIAGE NOTE - ARRIVAL FROM
How Severe Is Your Skin Lesion?: mild
Have Your Skin Lesions Been Treated?: not been treated
Home
Is This A New Presentation, Or A Follow-Up?: Skin Lesions

## 2019-05-07 ENCOUNTER — APPOINTMENT (OUTPATIENT)
Dept: INTERNAL MEDICINE | Facility: CLINIC | Age: 29
End: 2019-05-07
Payer: COMMERCIAL

## 2019-05-07 VITALS
WEIGHT: 198 LBS | TEMPERATURE: 98.3 F | HEART RATE: 87 BPM | HEIGHT: 72 IN | BODY MASS INDEX: 26.82 KG/M2 | OXYGEN SATURATION: 99 % | DIASTOLIC BLOOD PRESSURE: 68 MMHG | SYSTOLIC BLOOD PRESSURE: 116 MMHG

## 2019-05-07 DIAGNOSIS — J06.9 ACUTE UPPER RESPIRATORY INFECTION, UNSPECIFIED: ICD-10-CM

## 2019-05-07 DIAGNOSIS — R10.9 UNSPECIFIED ABDOMINAL PAIN: ICD-10-CM

## 2019-05-07 PROCEDURE — G0442 ANNUAL ALCOHOL SCREEN 15 MIN: CPT | Mod: 59

## 2019-05-07 PROCEDURE — G0444 DEPRESSION SCREEN ANNUAL: CPT | Mod: 59

## 2019-05-07 PROCEDURE — 99213 OFFICE O/P EST LOW 20 MIN: CPT

## 2019-05-07 RX ORDER — HYDROCORTISONE 25 MG/G
2.5 CREAM TOPICAL
Qty: 1 | Refills: 2 | Status: DISCONTINUED | COMMUNITY
Start: 2018-08-13 | End: 2019-05-07

## 2019-05-08 VITALS — DIASTOLIC BLOOD PRESSURE: 70 MMHG | SYSTOLIC BLOOD PRESSURE: 118 MMHG

## 2019-05-08 PROBLEM — R10.9 ABDOMINAL PAIN: Status: ACTIVE | Noted: 2017-09-13

## 2019-05-08 NOTE — ASSESSMENT
[FreeTextEntry1] : \par Labs drawn in Aug 2018 ok. He notes improvement in viral symptoms from last visit. Continues to experience neuropathic pain, controlled with Gabapentin.\par \par Depression screen performed today, PHQ2=0\par \par Annual alcohol misuse screen, 15 mins, done; negative

## 2019-05-08 NOTE — HISTORY OF PRESENT ILLNESS
[de-identified] : Presents for follow-up of viral symptoms from last visit, now much improved. He denies any more nausea/vomiting but continues to have pain related to neuropathy, controlled with Gabapentin.

## 2019-05-08 NOTE — PHYSICAL EXAM
[No Acute Distress] : no acute distress [Well Nourished] : well nourished [Well Developed] : well developed [Well-Appearing] : well-appearing [Normal Sclera/Conjunctiva] : normal sclera/conjunctiva [PERRL] : pupils equal round and reactive to light [EOMI] : extraocular movements intact [Normal Outer Ear/Nose] : the outer ears and nose were normal in appearance [Normal Oropharynx] : the oropharynx was normal [No JVD] : no jugular venous distention [Supple] : supple [Thyroid Normal, No Nodules] : the thyroid was normal and there were no nodules present [No Lymphadenopathy] : no lymphadenopathy [No Respiratory Distress] : no respiratory distress  [Clear to Auscultation] : lungs were clear to auscultation bilaterally [No Accessory Muscle Use] : no accessory muscle use [Normal Rate] : normal rate  [Regular Rhythm] : with a regular rhythm [Normal S1, S2] : normal S1 and S2 [No Murmur] : no murmur heard [No Carotid Bruits] : no carotid bruits [No Abdominal Bruit] : a ~M bruit was not heard ~T in the abdomen [No Varicosities] : no varicosities [Pedal Pulses Present] : the pedal pulses are present [No Extremity Clubbing/Cyanosis] : no extremity clubbing/cyanosis [No Edema] : there was no peripheral edema [No Palpable Aorta] : no palpable aorta [Soft] : abdomen soft [Non Tender] : non-tender [Non-distended] : non-distended [No Masses] : no abdominal mass palpated [No HSM] : no HSM [Normal Posterior Cervical Nodes] : no posterior cervical lymphadenopathy [Normal Bowel Sounds] : normal bowel sounds [Normal Anterior Cervical Nodes] : no anterior cervical lymphadenopathy [No CVA Tenderness] : no CVA  tenderness [No Spinal Tenderness] : no spinal tenderness [No Joint Swelling] : no joint swelling [Grossly Normal Strength/Tone] : grossly normal strength/tone [No Rash] : no rash [Normal Gait] : normal gait [Coordination Grossly Intact] : coordination grossly intact [No Focal Deficits] : no focal deficits [Deep Tendon Reflexes (DTR)] : deep tendon reflexes were 2+ and symmetric [Normal Affect] : the affect was normal [Normal Insight/Judgement] : insight and judgment were intact

## 2019-05-15 LAB
ALBUMIN SERPL ELPH-MCNC: 4.7 G/DL
ALP BLD-CCNC: 62 U/L
ALT SERPL-CCNC: 39 U/L
ANION GAP SERPL CALC-SCNC: 14 MMOL/L
APPEARANCE: CLEAR
AST SERPL-CCNC: 24 U/L
BACTERIA: NEGATIVE
BASOPHILS # BLD AUTO: 0.05 K/UL
BASOPHILS NFR BLD AUTO: 0.8 %
BILIRUB SERPL-MCNC: 0.5 MG/DL
BILIRUBIN URINE: NEGATIVE
BLOOD URINE: NEGATIVE
BUN SERPL-MCNC: 15 MG/DL
C TRACH RRNA SPEC QL NAA+PROBE: NOT DETECTED
CALCIUM SERPL-MCNC: 9.7 MG/DL
CHLORIDE SERPL-SCNC: 103 MMOL/L
CHOLEST SERPL-MCNC: 156 MG/DL
CHOLEST/HDLC SERPL: 2.9 RATIO
CO2 SERPL-SCNC: 25 MMOL/L
COLOR: NORMAL
CREAT SERPL-MCNC: 1.07 MG/DL
EOSINOPHIL # BLD AUTO: 0.12 K/UL
EOSINOPHIL NFR BLD AUTO: 1.9 %
ESTIMATED AVERAGE GLUCOSE: 105 MG/DL
GLUCOSE QUALITATIVE U: NEGATIVE
GLUCOSE SERPL-MCNC: 83 MG/DL
HBA1C MFR BLD HPLC: 5.3 %
HCT VFR BLD CALC: 50.1 %
HDLC SERPL-MCNC: 54 MG/DL
HGB BLD-MCNC: 15.6 G/DL
HIV1+2 AB SPEC QL IA.RAPID: NONREACTIVE
HYALINE CASTS: 0 /LPF
IMM GRANULOCYTES NFR BLD AUTO: 0.2 %
KETONES URINE: NEGATIVE
LDLC SERPL CALC-MCNC: 92 MG/DL
LEUKOCYTE ESTERASE URINE: NEGATIVE
LYMPHOCYTES # BLD AUTO: 2.18 K/UL
LYMPHOCYTES NFR BLD AUTO: 33.9 %
MAN DIFF?: NORMAL
MCHC RBC-ENTMCNC: 26.5 PG
MCHC RBC-ENTMCNC: 31.1 GM/DL
MCV RBC AUTO: 85.1 FL
MICROSCOPIC-UA: NORMAL
MONOCYTES # BLD AUTO: 0.46 K/UL
MONOCYTES NFR BLD AUTO: 7.1 %
N GONORRHOEA RRNA SPEC QL NAA+PROBE: NOT DETECTED
NEUTROPHILS # BLD AUTO: 3.62 K/UL
NEUTROPHILS NFR BLD AUTO: 56.1 %
NITRITE URINE: NEGATIVE
PH URINE: 6.5
PLATELET # BLD AUTO: 255 K/UL
POTASSIUM SERPL-SCNC: 4.5 MMOL/L
PROT SERPL-MCNC: 7.6 G/DL
PROTEIN URINE: NEGATIVE
RBC # BLD: 5.89 M/UL
RBC # FLD: 13.3 %
RED BLOOD CELLS URINE: 3 /HPF
SODIUM SERPL-SCNC: 142 MMOL/L
SOURCE AMPLIFICATION: NORMAL
SPECIFIC GRAVITY URINE: 1.02
SQUAMOUS EPITHELIAL CELLS: 0 /HPF
TRIGL SERPL-MCNC: 50 MG/DL
TSH SERPL-ACNC: 1.04 UIU/ML
UROBILINOGEN URINE: NORMAL
WBC # FLD AUTO: 6.44 K/UL
WHITE BLOOD CELLS URINE: 0 /HPF

## 2019-07-22 ENCOUNTER — MEDICATION RENEWAL (OUTPATIENT)
Age: 29
End: 2019-07-22

## 2019-07-22 ENCOUNTER — APPOINTMENT (OUTPATIENT)
Dept: INTERNAL MEDICINE | Facility: CLINIC | Age: 29
End: 2019-07-22

## 2019-07-26 ENCOUNTER — MEDICATION RENEWAL (OUTPATIENT)
Age: 29
End: 2019-07-26

## 2019-08-09 NOTE — ED ADULT TRIAGE NOTE - BP NONINVASIVE DIASTOLIC (MM HG)
"    Preventive Care at the 15 Month Visit  Growth Measurements & Percentiles  Head Circumference: 46.5 cm (18.31\") (78 %, Source: WHO (Girls, 0-2 years)) 78 %ile based on WHO (Girls, 0-2 years) head circumference-for-age based on Head Circumference recorded on 8/9/2019.   Weight: 20 lbs 0 oz / 9.07 kg (actual weight) / 39 %ile based on WHO (Girls, 0-2 years) weight-for-age data based on Weight recorded on 8/9/2019.    Length: 2' 5\" / 73.7 cm 15 %ile based on WHO (Girls, 0-2 years) Length-for-age data based on Length recorded on 8/9/2019.   Weight for length:59 %ile based on WHO (Girls, 0-2 years) weight-for-recumbent length based on body measurements available as of 8/9/2019.    Your toddler s next Preventive Check-up will be at 18 months of age    Development  At this age, most children will:    feed herself    say four to 10 words    stand alone and walk    stoop to  a toy    roll or toss a ball    drink from a sippy cup or cup    Feeding Tips    Your toddler can eat table foods and drink milk and water each day.  If she is still using a bottle, it may cause problems with her teeth.  A cup is recommended.    Give your toddler foods that are healthy and can be chewed easily.    Your toddler will prefer certain foods over others. Don t worry -- this will change.    You may offer your toddler a spoon to use.  She will need lots of practice.    Avoid small, hard foods that can cause choking (such as popcorn, nuts, hot dogs and carrots).    Your toddler may eat five to six small meals a day.    Give your toddler healthy snacks such as soft fruit, yogurt, beans, cheese and crackers.    Toilet Training    This age is a little too young to begin toilet training for most children.  You can put a potty chair in the bathroom.  At this age, your toddler will think of the potty chair as a toy.    Sleep    Your toddler may go from two to one nap each day during the next 6 months.    Your toddler should sleep about 11 to " 16 hours each day.    Continue your regular nighttime routine which may include bathing, brushing teeth and reading.    Safety    Use an approved toddler car seat every time your child rides in the car.  Make sure to install it in the back seat.  Car seats should be rear facing until your child is 2 years of age.    Falls at this age are common.  Keep olm[place] on all stairways and doors to dangerous areas.    Keep all medicines, cleaning supplies and poisons out of your toddler s reach.  Call the poison control center or your health care provider for directions in case your toddler swallows poison.    Put the poison control number on all phones:  1-986.154.5757.    Use safety catches on drawers and cupboards.  Cover electrical outlets with plastic covers.    Use sunscreen with a SPF of more than 15 when your toddler is outside.    Always keep the crib sides up to the highest position and the crib mattress at the lowest setting.    Teach your toddler to wash her hands and face often. This is important before eating and drinking.    Always put a helmet on your toddler if she rides in a bicycle carrier or behind you on a bike.    Never leave your child alone in the bathtub or near water.    Do not leave your child alone in the car, even if he or she is asleep.    What Your Toddler Needs    Read to your toddler often.    Hug, cuddle and kiss your toddler often.  Your toddler is gaining independence but still needs to know you love and support her.    Let your toddler make some choices. Ask her,  Would you like to wear, the green shirt or the red shirt?     Set a few clear rules and be consistent with them.    Teach your toddler about sharing.  Just know that she may not be ready for this.    Teach and praise positive behaviors.  Distract and prevent negative or dangerous behaviors.    Ignore temper tantrums.  Make sure the toddler is safe during the tantrum.  Or, you may hold your toddler gently, but firmly.    Never  physically or emotionally hurt your child.  If you are losing control, take a few deep breaths, put your child in a safe place and go into another room for a few minutes.  If possible, have someone else watch your child so you can take a break.  Call a friend, the Parent Warmline (115-955-8657) or call the Crisis Nursery ([phone]).    The American Academy of Pediatrics does not recommend television for children age 2 or younger.    Dental Care    Brush your child's teeth one to two times each day with a soft-bristled toothbrush.    Use a small amount (no more than pea size) of fluoridated toothpaste once daily.    Parents should do the brushing and then let the child play with the toothbrush.    Your pediatric provider will speak with your regarding the need for regular dental appointments for cleanings and check-ups starting when your child s first tooth appears. (Your child may need fluoride supplements if you have well water.)           69

## 2019-09-25 ENCOUNTER — APPOINTMENT (OUTPATIENT)
Dept: INTERNAL MEDICINE | Facility: CLINIC | Age: 29
End: 2019-09-25
Payer: COMMERCIAL

## 2019-09-25 VITALS
BODY MASS INDEX: 27.77 KG/M2 | HEIGHT: 72 IN | WEIGHT: 205 LBS | OXYGEN SATURATION: 98 % | DIASTOLIC BLOOD PRESSURE: 70 MMHG | TEMPERATURE: 98.6 F | HEART RATE: 78 BPM | SYSTOLIC BLOOD PRESSURE: 120 MMHG

## 2019-09-25 DIAGNOSIS — E66.3 OVERWEIGHT: ICD-10-CM

## 2019-09-25 PROCEDURE — 99395 PREV VISIT EST AGE 18-39: CPT

## 2019-09-25 PROCEDURE — 99213 OFFICE O/P EST LOW 20 MIN: CPT | Mod: 25

## 2019-09-25 RX ORDER — CLOBETASOL PROPIONATE 0.5 MG/G
0.05 CREAM TOPICAL TWICE DAILY
Qty: 1 | Refills: 2 | Status: DISCONTINUED | COMMUNITY
Start: 2019-07-26 | End: 2019-09-25

## 2019-09-25 RX ORDER — MELOXICAM 7.5 MG/1
7.5 TABLET ORAL
Qty: 30 | Refills: 3 | Status: DISCONTINUED | COMMUNITY
Start: 2019-05-07 | End: 2019-09-25

## 2019-09-25 RX ORDER — OXYCODONE 5 MG/1
5 TABLET ORAL TWICE DAILY
Qty: 10 | Refills: 0 | Status: DISCONTINUED | COMMUNITY
Start: 2019-07-22 | End: 2019-09-25

## 2019-09-25 RX ORDER — ALBUTEROL SULFATE 90 UG/1
108 (90 BASE) AEROSOL, METERED RESPIRATORY (INHALATION) EVERY 4 HOURS
Qty: 1 | Refills: 0 | Status: DISCONTINUED | COMMUNITY
Start: 2019-07-22 | End: 2019-09-25

## 2019-09-25 NOTE — HEALTH RISK ASSESSMENT
[Good] : ~his/her~  mood as  good [Yes] : Yes [Monthly or less (1 pt)] : Monthly or less (1 point) [1 or 2 (0 pts)] : 1 or 2 (0 points) [Never (0 pts)] : Never (0 points) [No falls in past year] : Patient reported no falls in the past year [None] : None [With Family] : lives with family [Employed] : employed [Single] : single [Feels Safe at Home] : Feels safe at home [Fully functional (bathing, dressing, toileting, transferring, walking, feeding)] : Fully functional (bathing, dressing, toileting, transferring, walking, feeding) [Fully functional (using the telephone, shopping, preparing meals, housekeeping, doing laundry, using] : Fully functional and needs no help or supervision to perform IADLs (using the telephone, shopping, preparing meals, housekeeping, doing laundry, using transportation, managing medications and managing finances) [Reports normal functional visual acuity (ie: able to read med bottle)] : Reports normal functional visual acuity [Smoke Detector] : smoke detector [Carbon Monoxide Detector] : carbon monoxide detector [Safety elements used in home] : safety elements used in home [Seat Belt] :  uses seat belt [Sunscreen] : uses sunscreen [Reviewed no changes] : Reviewed no changes [] : No [Audit-CScore] : 1 [Change in mental status noted] : No change in mental status noted [Language] : denies difficulty with language [Behavior] : denies difficulty with behavior [Learning/Retaining New Information] : denies difficulty learning/retaining new information [Handling Complex Tasks] : denies difficulty handling complex tasks [Reasoning] : denies difficulty with reasoning [Spatial Ability and Orientation] : denies difficulty with spatial ability and orientation [Sexually Active] : not sexually active [High Risk Behavior] : no high risk behavior [Reports changes in hearing] : Reports no changes in hearing [Reports changes in dental health] : Reports no changes in dental health [Reports changes in vision] : Reports no changes in vision [Guns at Home] : no guns at home [Travel to Developing Areas] : does not  travel to developing areas [TB Exposure] : is not being exposed to tuberculosis [Caregiver Concerns] : does not have caregiver concerns [AdvancecareDate] : 09/19

## 2019-09-25 NOTE — HISTORY OF PRESENT ILLNESS
[FreeTextEntry1] : Presents for preventive visit as well as acute concerns of esophageal reflux and overweight. [de-identified] : \par Preventive visit: pt is up to date with vaccines; he does not smoke cigarettes and does not misuse alcohol; he feels safe at home and has smoke/CO detectors in the house; he wears seatbelts in vehicles\par \par Medical Issue 1: Esophageal reflux: unstable and worsening with more chest discomfort now after eating spicy foods and drinking caffeine; he denies melena, BRBPR and coffee ground emesis\par \par Medical Issue 2: Overweight: unstable with increase of 50 lbs over the past year; he admits to poor diet and exercise routine; he denies shortness of breath and dizziness

## 2019-09-25 NOTE — ASSESSMENT
[FreeTextEntry1] : \par Preventive visit: pt is up to date with vaccines; he does not smoke cigarettes and does not misuse alcohol; encouraged use of smoke/CO detectors in the house and use of seatbelts in vehicles\par \par Medical Issue 1: Esophageal reflux: unstable and worsening with more chest discomfort now after eating spicy foods and drinking caffeine; prescribed PPi daily, 30 mins before breakfast and coordinated follow-up care with GI for EGD\par \par Medical Issue 2: Overweight: unstable with increase of 50 lbs over the past year; he admits to poor diet and exercise routine; advised diet, exercise and weight loss; check TSH and A1c levels

## 2019-09-25 NOTE — PHYSICAL EXAM
[No Acute Distress] : no acute distress [Well Nourished] : well nourished [Well-Appearing] : well-appearing [Well Developed] : well developed [Normal Sclera/Conjunctiva] : normal sclera/conjunctiva [EOMI] : extraocular movements intact [PERRL] : pupils equal round and reactive to light [Normal Outer Ear/Nose] : the outer ears and nose were normal in appearance [Normal Oropharynx] : the oropharynx was normal [No JVD] : no jugular venous distention [No Lymphadenopathy] : no lymphadenopathy [Supple] : supple [Thyroid Normal, No Nodules] : the thyroid was normal and there were no nodules present [No Respiratory Distress] : no respiratory distress  [Clear to Auscultation] : lungs were clear to auscultation bilaterally [No Accessory Muscle Use] : no accessory muscle use [Normal Rate] : normal rate  [Regular Rhythm] : with a regular rhythm [Normal S1, S2] : normal S1 and S2 [No Murmur] : no murmur heard [No Carotid Bruits] : no carotid bruits [No Abdominal Bruit] : a ~M bruit was not heard ~T in the abdomen [No Varicosities] : no varicosities [Pedal Pulses Present] : the pedal pulses are present [No Edema] : there was no peripheral edema [No Extremity Clubbing/Cyanosis] : no extremity clubbing/cyanosis [No Palpable Aorta] : no palpable aorta [Non Tender] : non-tender [Soft] : abdomen soft [No Masses] : no abdominal mass palpated [Non-distended] : non-distended [No HSM] : no HSM [Normal Bowel Sounds] : normal bowel sounds [Normal Posterior Cervical Nodes] : no posterior cervical lymphadenopathy [Normal Anterior Cervical Nodes] : no anterior cervical lymphadenopathy [No Spinal Tenderness] : no spinal tenderness [No CVA Tenderness] : no CVA  tenderness [No Joint Swelling] : no joint swelling [No Rash] : no rash [Grossly Normal Strength/Tone] : grossly normal strength/tone [Coordination Grossly Intact] : coordination grossly intact [No Focal Deficits] : no focal deficits [Normal Gait] : normal gait [Deep Tendon Reflexes (DTR)] : deep tendon reflexes were 2+ and symmetric [Normal Affect] : the affect was normal [Normal Insight/Judgement] : insight and judgment were intact

## 2019-09-27 LAB
ALBUMIN SERPL ELPH-MCNC: 4.7 G/DL
ALP BLD-CCNC: 64 U/L
ALT SERPL-CCNC: 45 U/L
ANION GAP SERPL CALC-SCNC: 13 MMOL/L
APPEARANCE: CLEAR
AST SERPL-CCNC: 26 U/L
BACTERIA: NEGATIVE
BASOPHILS # BLD AUTO: 0.07 K/UL
BASOPHILS NFR BLD AUTO: 1 %
BILIRUB SERPL-MCNC: 0.8 MG/DL
BILIRUBIN URINE: NEGATIVE
BLOOD URINE: NEGATIVE
BUN SERPL-MCNC: 16 MG/DL
CALCIUM SERPL-MCNC: 9.8 MG/DL
CHLORIDE SERPL-SCNC: 101 MMOL/L
CHOLEST SERPL-MCNC: 186 MG/DL
CHOLEST/HDLC SERPL: 3 RATIO
CO2 SERPL-SCNC: 26 MMOL/L
COLOR: YELLOW
CREAT SERPL-MCNC: 1.05 MG/DL
EOSINOPHIL # BLD AUTO: 0.24 K/UL
EOSINOPHIL NFR BLD AUTO: 3.4 %
ESTIMATED AVERAGE GLUCOSE: 108 MG/DL
GLUCOSE QUALITATIVE U: NEGATIVE
GLUCOSE SERPL-MCNC: 82 MG/DL
HBA1C MFR BLD HPLC: 5.4 %
HCT VFR BLD CALC: 52.7 %
HDLC SERPL-MCNC: 63 MG/DL
HGB BLD-MCNC: 16.5 G/DL
HIV1+2 AB SPEC QL IA.RAPID: NONREACTIVE
HYALINE CASTS: 0 /LPF
IMM GRANULOCYTES NFR BLD AUTO: 0.3 %
KETONES URINE: NEGATIVE
LDLC SERPL CALC-MCNC: 107 MG/DL
LEUKOCYTE ESTERASE URINE: NEGATIVE
LYMPHOCYTES # BLD AUTO: 2.21 K/UL
LYMPHOCYTES NFR BLD AUTO: 31.3 %
MAN DIFF?: NORMAL
MCHC RBC-ENTMCNC: 26.1 PG
MCHC RBC-ENTMCNC: 31.3 GM/DL
MCV RBC AUTO: 83.5 FL
MICROSCOPIC-UA: NORMAL
MONOCYTES # BLD AUTO: 0.61 K/UL
MONOCYTES NFR BLD AUTO: 8.6 %
NEUTROPHILS # BLD AUTO: 3.92 K/UL
NEUTROPHILS NFR BLD AUTO: 55.4 %
NITRITE URINE: NEGATIVE
PH URINE: 6
PLATELET # BLD AUTO: 288 K/UL
POTASSIUM SERPL-SCNC: 4.4 MMOL/L
PROT SERPL-MCNC: 7.6 G/DL
PROTEIN URINE: NEGATIVE
RBC # BLD: 6.31 M/UL
RBC # FLD: 14.1 %
RED BLOOD CELLS URINE: 4 /HPF
SODIUM SERPL-SCNC: 140 MMOL/L
SPECIFIC GRAVITY URINE: 1.03
SQUAMOUS EPITHELIAL CELLS: 0 /HPF
TRIGL SERPL-MCNC: 79 MG/DL
TSH SERPL-ACNC: 0.88 UIU/ML
UROBILINOGEN URINE: NORMAL
WBC # FLD AUTO: 7.07 K/UL
WHITE BLOOD CELLS URINE: 1 /HPF

## 2019-10-23 ENCOUNTER — APPOINTMENT (OUTPATIENT)
Dept: INTERNAL MEDICINE | Facility: CLINIC | Age: 29
End: 2019-10-23

## 2019-11-25 PROBLEM — Z23 NEED FOR MENINGOCOCCUS VACCINE: Status: ACTIVE | Noted: 2019-11-25

## 2019-11-25 PROBLEM — Z23 FLU VACCINE NEED: Status: ACTIVE | Noted: 2019-11-25

## 2019-11-27 ENCOUNTER — APPOINTMENT (OUTPATIENT)
Dept: INTERNAL MEDICINE | Facility: CLINIC | Age: 29
End: 2019-11-27
Payer: COMMERCIAL

## 2019-11-27 DIAGNOSIS — Z23 ENCOUNTER FOR IMMUNIZATION: ICD-10-CM

## 2019-11-27 PROCEDURE — 90471 IMMUNIZATION ADMIN: CPT

## 2019-11-27 PROCEDURE — 90621 MENB-FHBP VACC 2/3 DOSE IM: CPT

## 2020-02-08 DIAGNOSIS — S05.00XA INJURY OF CONJUNCTIVA AND CORNEAL ABRASION W/OUT FOREIGN BODY, UNSPECIFIED EYE, INITIAL ENCOUNTER: ICD-10-CM

## 2020-03-05 NOTE — ED PROVIDER NOTE - PHYSICAL EXAMINATION
General: Patient awake alert NAD.   HEENT: normocephalic, atraumatic, EMOI, PERRL, neck supple, no JVD  Cardiac: RRR, S1, S2, no murmur, rubs, gallop, equal bilateral radial pulses.   LUNGS: CTA B/L no wheeze, rhonchi, rales.   Abdomen: soft NT, ND, no rebound no guarding, no CVA tenderness.   EXT: strength and ROM at patient's baseline, no edema, no calf tenderness,   Neuro: A&Ox3 gait normal, no focal neurological deficits, CN 2-12 intact,  no dysmetria, no pronator drift, no rhomberg.  Skin: warm, dry, no rash, no lesions General: Patient awake alert NAD.   HEENT: normocephalic, atraumatic, EMOI, PERRL, neck supple, no JVD  Cardiac: RRR, S1, S2, no murmur, rubs, gallop, equal bilateral radial pulses.   LUNGS: CTA B/L no wheeze, rhonchi, rales.   Abdomen: soft NT, ND, no rebound no guarding, no CVA tenderness.   EXT: strength and ROM at patient's baseline, no edema, no calf tenderness,   Neuro: A&Ox3, gait normal, no focal neurological deficits, CN 2-12 intact,  no dysmetria, no pronator drift, no rhomberg.  Skin: warm, dry, no rash, no lesions details… detailed exam no calf tenderness/normal strength/ROM intact no

## 2020-03-10 ENCOUNTER — APPOINTMENT (OUTPATIENT)
Dept: INTERNAL MEDICINE | Facility: CLINIC | Age: 30
End: 2020-03-10

## 2020-07-02 ENCOUNTER — NON-APPOINTMENT (OUTPATIENT)
Age: 30
End: 2020-07-02

## 2020-07-02 ENCOUNTER — APPOINTMENT (OUTPATIENT)
Dept: INTERNAL MEDICINE | Facility: CLINIC | Age: 30
End: 2020-07-02
Payer: COMMERCIAL

## 2020-07-02 VITALS
WEIGHT: 222 LBS | TEMPERATURE: 98 F | HEART RATE: 87 BPM | SYSTOLIC BLOOD PRESSURE: 122 MMHG | OXYGEN SATURATION: 97 % | HEIGHT: 72 IN | DIASTOLIC BLOOD PRESSURE: 83 MMHG | BODY MASS INDEX: 30.07 KG/M2

## 2020-07-02 DIAGNOSIS — R05 COUGH: ICD-10-CM

## 2020-07-02 PROCEDURE — 99214 OFFICE O/P EST MOD 30 MIN: CPT | Mod: 25

## 2020-07-02 PROCEDURE — 99395 PREV VISIT EST AGE 18-39: CPT | Mod: 25

## 2020-07-02 PROCEDURE — 93000 ELECTROCARDIOGRAM COMPLETE: CPT | Mod: 59

## 2020-07-02 NOTE — PHYSICAL EXAM
[No Acute Distress] : no acute distress [Well Developed] : well developed [Well Nourished] : well nourished [Normal Sclera/Conjunctiva] : normal sclera/conjunctiva [PERRL] : pupils equal round and reactive to light [Well-Appearing] : well-appearing [EOMI] : extraocular movements intact [Normal Outer Ear/Nose] : the outer ears and nose were normal in appearance [No JVD] : no jugular venous distention [Normal Oropharynx] : the oropharynx was normal [No Lymphadenopathy] : no lymphadenopathy [Supple] : supple [Thyroid Normal, No Nodules] : the thyroid was normal and there were no nodules present [No Respiratory Distress] : no respiratory distress  [No Accessory Muscle Use] : no accessory muscle use [Clear to Auscultation] : lungs were clear to auscultation bilaterally [Normal Rate] : normal rate  [Regular Rhythm] : with a regular rhythm [Normal S1, S2] : normal S1 and S2 [No Murmur] : no murmur heard [No Abdominal Bruit] : a ~M bruit was not heard ~T in the abdomen [No Carotid Bruits] : no carotid bruits [No Varicosities] : no varicosities [Pedal Pulses Present] : the pedal pulses are present [No Edema] : there was no peripheral edema [No Palpable Aorta] : no palpable aorta [No Extremity Clubbing/Cyanosis] : no extremity clubbing/cyanosis [Soft] : abdomen soft [Non Tender] : non-tender [Non-distended] : non-distended [No Masses] : no abdominal mass palpated [No HSM] : no HSM [Normal Bowel Sounds] : normal bowel sounds [Normal Posterior Cervical Nodes] : no posterior cervical lymphadenopathy [Normal Anterior Cervical Nodes] : no anterior cervical lymphadenopathy [No CVA Tenderness] : no CVA  tenderness [No Spinal Tenderness] : no spinal tenderness [No Joint Swelling] : no joint swelling [Grossly Normal Strength/Tone] : grossly normal strength/tone [No Rash] : no rash [Coordination Grossly Intact] : coordination grossly intact [No Focal Deficits] : no focal deficits [Deep Tendon Reflexes (DTR)] : deep tendon reflexes were 2+ and symmetric [Normal Gait] : normal gait [Normal Affect] : the affect was normal [Normal Insight/Judgement] : insight and judgment were intact

## 2020-07-02 NOTE — ASSESSMENT
[FreeTextEntry1] : \par Preventive visit: pt is up to date with vaccines; he does not smoke cigarettes and does not misuse alcohol; encouraged use of smoke/CO detectors in the house and use of seatbelts in vehicles\par \par Medical Issue 1: Esophageal reflux: unstable and worsening with more chest discomfort now after eating spicy foods and drinking caffeine; prescribed PPi daily, 30 mins before breakfast and coordinated follow-up care with GI for EGD\par \par Medical Issue 2: Obesity counselling: 15 mins, assessed BMI at 30 and above now in obese range; advised weight loss, exercise routine and diet; pt agreed to start exercise program for target weight loss 20 lbs; assisted patient with resources including nutrition referral as needed and arranged for follow-up to monitor weight loss progress over next several months\par \par Medical Issue 3: Cough: new symptom, started 1 month ago; no known COVID exposures, no fevers and no chills; he feels like his airways are "tight" and tries to take deep breaths to feel better; he denies chest pain and palpitations; check chest X-ray and prescribed tessalon perles as needed with Albuterol MDI to open up airways; coordinated follow-up care with pulmonary as well\par \par Annual alcohol misuse screen, 15 mins, done; negative

## 2020-07-02 NOTE — HISTORY OF PRESENT ILLNESS
[FreeTextEntry1] : Presents for preventive visit as well as acute concerns of esophageal reflux, cough and obesity. [de-identified] : \par Preventive visit: pt is up to date with vaccines; he does not smoke cigarettes and does not misuse alcohol; he feels safe at home and has smoke/CO detectors in the house; he wears seatbelts in vehicles\par \par Medical Issue 1: Esophageal reflux: unstable and worsening with more chest discomfort now after eating spicy foods and drinking caffeine; he denies melena, BRBPR and coffee ground emesis\par \par Medical Issue 2: Obesity: unstable with increase of 20 lbs over the past year; he admits to poor diet and exercise routine; he denies shortness of breath and dizziness\par \par Medical Issue 3: Cough: new symptom, started 1 month ago; no known COVID exposures, no fevers and no chills; he feels like his airways are "tight" and tries to take deep breaths to feel better; he denies chest pain and palpitations

## 2020-07-02 NOTE — HEALTH RISK ASSESSMENT
[Good] : ~his/her~  mood as  good [] : No [Yes] : Yes [Monthly or less (1 pt)] : Monthly or less (1 point) [1 or 2 (0 pts)] : 1 or 2 (0 points) [Never (0 pts)] : Never (0 points) [No falls in past year] : Patient reported no falls in the past year [Audit-CScore] : 1 [Language] : denies difficulty with language [Change in mental status noted] : No change in mental status noted [Behavior] : denies difficulty with behavior [Learning/Retaining New Information] : denies difficulty learning/retaining new information [Handling Complex Tasks] : denies difficulty handling complex tasks [Reasoning] : denies difficulty with reasoning [Spatial Ability and Orientation] : denies difficulty with spatial ability and orientation [None] : None [With Family] : lives with family [Employed] : employed [Single] : single [High Risk Behavior] : no high risk behavior [Sexually Active] : not sexually active [Feels Safe at Home] : Feels safe at home [Fully functional (bathing, dressing, toileting, transferring, walking, feeding)] : Fully functional (bathing, dressing, toileting, transferring, walking, feeding) [Fully functional (using the telephone, shopping, preparing meals, housekeeping, doing laundry, using] : Fully functional and needs no help or supervision to perform IADLs (using the telephone, shopping, preparing meals, housekeeping, doing laundry, using transportation, managing medications and managing finances) [Reports changes in vision] : Reports no changes in vision [Reports changes in hearing] : Reports no changes in hearing [Reports changes in dental health] : Reports no changes in dental health [Reports normal functional visual acuity (ie: able to read med bottle)] : Reports normal functional visual acuity [Smoke Detector] : smoke detector [Carbon Monoxide Detector] : carbon monoxide detector [Guns at Home] : no guns at home [Safety elements used in home] : safety elements used in home [Sunscreen] : uses sunscreen [Seat Belt] :  uses seat belt [Travel to Developing Areas] : does not  travel to developing areas [TB Exposure] : is not being exposed to tuberculosis [Caregiver Concerns] : does not have caregiver concerns [Reviewed no changes] : Reviewed no changes [AdvancecareDate] : 09/19

## 2020-07-06 LAB
25(OH)D3 SERPL-MCNC: 21.5 NG/ML
ALBUMIN SERPL ELPH-MCNC: 4.5 G/DL
ALP BLD-CCNC: 71 U/L
ALT SERPL-CCNC: 60 U/L
ANION GAP SERPL CALC-SCNC: 14 MMOL/L
AST SERPL-CCNC: 33 U/L
BASOPHILS # BLD AUTO: 0.05 K/UL
BASOPHILS NFR BLD AUTO: 0.8 %
BILIRUB SERPL-MCNC: 0.5 MG/DL
BUN SERPL-MCNC: 10 MG/DL
CALCIUM SERPL-MCNC: 9.1 MG/DL
CHLORIDE SERPL-SCNC: 103 MMOL/L
CHOLEST SERPL-MCNC: 156 MG/DL
CHOLEST/HDLC SERPL: 3.1 RATIO
CO2 SERPL-SCNC: 24 MMOL/L
CREAT SERPL-MCNC: 1.09 MG/DL
EOSINOPHIL # BLD AUTO: 0.18 K/UL
EOSINOPHIL NFR BLD AUTO: 2.8 %
ESTIMATED AVERAGE GLUCOSE: 108 MG/DL
GLUCOSE SERPL-MCNC: 90 MG/DL
HBA1C MFR BLD HPLC: 5.4 %
HCT VFR BLD CALC: 50.5 %
HDLC SERPL-MCNC: 50 MG/DL
HGB BLD-MCNC: 16.1 G/DL
HIV1+2 AB SPEC QL IA.RAPID: NONREACTIVE
IMM GRANULOCYTES NFR BLD AUTO: 0.3 %
LDLC SERPL CALC-MCNC: 89 MG/DL
LYMPHOCYTES # BLD AUTO: 2.14 K/UL
LYMPHOCYTES NFR BLD AUTO: 33.6 %
MAN DIFF?: NORMAL
MCHC RBC-ENTMCNC: 26.5 PG
MCHC RBC-ENTMCNC: 31.9 GM/DL
MCV RBC AUTO: 83.1 FL
MONOCYTES # BLD AUTO: 0.58 K/UL
MONOCYTES NFR BLD AUTO: 9.1 %
NEUTROPHILS # BLD AUTO: 3.39 K/UL
NEUTROPHILS NFR BLD AUTO: 53.4 %
PLATELET # BLD AUTO: 255 K/UL
POTASSIUM SERPL-SCNC: 4.7 MMOL/L
PROT SERPL-MCNC: 7.5 G/DL
RBC # BLD: 6.08 M/UL
RBC # FLD: 14.1 %
SARS-COV-2 IGG SERPL IA-ACNC: 0.08 INDEX
SARS-COV-2 IGG SERPL QL IA: NEGATIVE
SODIUM SERPL-SCNC: 141 MMOL/L
TRIGL SERPL-MCNC: 88 MG/DL
TSH SERPL-ACNC: 0.85 UIU/ML
WBC # FLD AUTO: 6.36 K/UL

## 2020-07-06 RX ORDER — BENZONATATE 100 MG/1
100 CAPSULE ORAL 3 TIMES DAILY
Qty: 30 | Refills: 3 | Status: COMPLETED | COMMUNITY
Start: 2020-07-02 | End: 2020-08-15

## 2020-07-25 ENCOUNTER — OUTPATIENT (OUTPATIENT)
Dept: OUTPATIENT SERVICES | Facility: HOSPITAL | Age: 30
LOS: 1 days | End: 2020-07-25
Payer: COMMERCIAL

## 2020-07-25 ENCOUNTER — APPOINTMENT (OUTPATIENT)
Dept: RADIOLOGY | Facility: CLINIC | Age: 30
End: 2020-07-25
Payer: COMMERCIAL

## 2020-07-25 DIAGNOSIS — Z90.49 ACQUIRED ABSENCE OF OTHER SPECIFIED PARTS OF DIGESTIVE TRACT: Chronic | ICD-10-CM

## 2020-07-25 DIAGNOSIS — K21.9 GASTRO-ESOPHAGEAL REFLUX DISEASE WITHOUT ESOPHAGITIS: ICD-10-CM

## 2020-07-25 PROCEDURE — 71046 X-RAY EXAM CHEST 2 VIEWS: CPT

## 2020-07-25 PROCEDURE — 71046 X-RAY EXAM CHEST 2 VIEWS: CPT | Mod: 26

## 2020-10-01 LAB
ALBUMIN SERPL ELPH-MCNC: 4.7 G/DL
ALP BLD-CCNC: 82 U/L
ALT SERPL-CCNC: 74 U/L
ANION GAP SERPL CALC-SCNC: 18 MMOL/L
APPEARANCE: CLEAR
AST SERPL-CCNC: 39 U/L
BACTERIA: NEGATIVE
BASOPHILS # BLD AUTO: 0.06 K/UL
BASOPHILS NFR BLD AUTO: 1 %
BILIRUB SERPL-MCNC: 0.6 MG/DL
BILIRUBIN URINE: NEGATIVE
BLOOD URINE: NEGATIVE
BUN SERPL-MCNC: 14 MG/DL
CALCIUM SERPL-MCNC: 10 MG/DL
CHLORIDE SERPL-SCNC: 106 MMOL/L
CHOLEST SERPL-MCNC: 185 MG/DL
CHOLEST/HDLC SERPL: 3.6 RATIO
CO2 SERPL-SCNC: 22 MMOL/L
COLOR: YELLOW
CREAT SERPL-MCNC: 1.07 MG/DL
EOSINOPHIL # BLD AUTO: 0.14 K/UL
EOSINOPHIL NFR BLD AUTO: 2.3 %
ESTIMATED AVERAGE GLUCOSE: 108 MG/DL
GLUCOSE QUALITATIVE U: NEGATIVE
GLUCOSE SERPL-MCNC: 89 MG/DL
HBA1C MFR BLD HPLC: 5.4 %
HCT VFR BLD CALC: 54.6 %
HDLC SERPL-MCNC: 52 MG/DL
HGB BLD-MCNC: 16.5 G/DL
HIV1+2 AB SPEC QL IA.RAPID: NONREACTIVE
HYALINE CASTS: 0 /LPF
IMM GRANULOCYTES NFR BLD AUTO: 0.3 %
KETONES URINE: NEGATIVE
LDLC SERPL CALC-MCNC: 116 MG/DL
LEUKOCYTE ESTERASE URINE: NEGATIVE
LYMPHOCYTES # BLD AUTO: 1.9 K/UL
LYMPHOCYTES NFR BLD AUTO: 30.8 %
MAN DIFF?: NORMAL
MCHC RBC-ENTMCNC: 26.2 PG
MCHC RBC-ENTMCNC: 30.2 GM/DL
MCV RBC AUTO: 86.7 FL
MICROSCOPIC-UA: NORMAL
MONOCYTES # BLD AUTO: 0.5 K/UL
MONOCYTES NFR BLD AUTO: 8.1 %
NEUTROPHILS # BLD AUTO: 3.54 K/UL
NEUTROPHILS NFR BLD AUTO: 57.5 %
NITRITE URINE: NEGATIVE
PH URINE: 6
PLATELET # BLD AUTO: 277 K/UL
POTASSIUM SERPL-SCNC: 5.1 MMOL/L
PROT SERPL-MCNC: 7.6 G/DL
PROTEIN URINE: NEGATIVE
RBC # BLD: 6.3 M/UL
RBC # FLD: 14.7 %
RED BLOOD CELLS URINE: 0 /HPF
SARS-COV-2 IGG SERPL IA-ACNC: <0.1 INDEX
SARS-COV-2 IGG SERPL QL IA: NEGATIVE
SODIUM SERPL-SCNC: 145 MMOL/L
SPECIFIC GRAVITY URINE: 1.03
SQUAMOUS EPITHELIAL CELLS: 0 /HPF
TRIGL SERPL-MCNC: 85 MG/DL
TSH SERPL-ACNC: 0.52 UIU/ML
UROBILINOGEN URINE: NORMAL
WBC # FLD AUTO: 6.16 K/UL
WHITE BLOOD CELLS URINE: 0 /HPF

## 2020-10-16 ENCOUNTER — RX RENEWAL (OUTPATIENT)
Age: 30
End: 2020-10-16

## 2020-12-21 PROBLEM — J06.9 VIRAL URI: Status: RESOLVED | Noted: 2019-05-08 | Resolved: 2020-12-21

## 2021-01-05 ENCOUNTER — APPOINTMENT (OUTPATIENT)
Dept: INTERNAL MEDICINE | Facility: CLINIC | Age: 31
End: 2021-01-05

## 2021-01-21 RX ORDER — BENZONATATE 100 MG/1
100 CAPSULE ORAL 3 TIMES DAILY
Qty: 30 | Refills: 0 | Status: COMPLETED | COMMUNITY
Start: 2021-01-21 | End: 2021-01-31

## 2021-02-11 ENCOUNTER — NON-APPOINTMENT (OUTPATIENT)
Age: 31
End: 2021-02-11

## 2021-02-11 DIAGNOSIS — H66.90 OTITIS MEDIA, UNSPECIFIED, UNSPECIFIED EAR: ICD-10-CM

## 2021-03-15 RX ORDER — HYDROCORTISONE 25 MG/G
2.5 CREAM TOPICAL DAILY
Qty: 1 | Refills: 1 | Status: ACTIVE | COMMUNITY
Start: 2021-03-15 | End: 1900-01-01

## 2021-04-13 NOTE — ED ADULT TRIAGE NOTE - NS ED NURSE BANDS TYPE
Office : [phone]     Fax :[phone]       Nephrology  Note      Patient's Name: Dorian Moritz  8:17 AM  4/13/2021    Reason for Consult:  Kia Montejo on CKD , metabolic acidosis     Chief Complaint:    Chief Complaint   Patient presents with    Shortness of Breath     since Easter. no cough. increase pain with breating         History of Present Ilness:    Dorian Moritz is a 70 y.o. 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 She reports cough with no significant production. No fevers or chills. In the ER she had CT chest done which did not reveal any acute pathology  Laboratory work-up noted for increased in creatinine from a baseline of 1.5-2.0. EKG sinus rhythm with no ischemic changes. Troponin 0 0.03.     INTERVAL HISTORY    Feels better  No SOB  Creatinine stable   Breathing better     I/O last 3 completed shifts:  In: -   Out: 800 [Urine:800]    Past Medical History:   Diagnosis Date    Bullous emphysema (HCC)     CKD (chronic kidney disease) stage 3, GFR 30-59 ml/min     Clostridium difficile carrier 01/21/2019    COPD (chronic obstructive pulmonary disease) (HCC)     PFT done 7 years ago   Tristian Hickey Crohn's disease ([facility] [address]     Crohn's disease    Depression 1/30/2011    pt states resolved as of 3-26-12    DVT (deep venous thrombosis) ([facility] [address]     2012; first ocurrence     Hiatal hernia     Hx of blood clots     Kidney disease     Kidney insufficiency     Osteoporosis Name band;  Peripheral neuropathy     neuropathy in legs    Pneumonia     Postmenopausal     LMP in  40's    Pulmonary embolism ([facility] [address]     2012; first ocurrence    Sepsis ([facility] [address]     Syringomyelia ([facility] [address]        Past Surgical History:   Procedure Laterality Date    ABDOMEN SURGERY      BACK SURGERY      shunt to drain CSF    BIOPSY SHOULDER Left 2/27/2019    EXCISION OF LEFT SHOULDER MASS performed by Charles Gray MD at [facility]      crainotomy- remove fluid ? V-P SHUNT    BRONCHOSCOPY N/A 9/2/2020    BRONCHOSCOPY ALVEOLAR LAVAGE performed by Luma Amin MD at [facility]      resection X2    COLONOSCOPY  12/5/11    BIOPSY AND POLYPECTOMY    COLONOSCOPY  4-2-2012    and ablation ERBE/APC    SHOULDER SURGERY      L        Family History   Problem Relation Age of Onset    Heart Disease Mother     High Blood Pressure Mother     High Cholesterol Mother     Early Death Mother 36        MI    Heart Disease Father     High Blood Pressure Father     High Cholesterol Father     Stroke Father 79    High Blood Pressure Sister     High Blood Pressure Brother         reports that she quit smoking about 6 years ago. Her smoking use included cigarettes. She has a 7.50 pack-year smoking history. She has never used smokeless tobacco. She reports that she does not drink alcohol or use drugs. Allergies:  Patient has no known allergies.     Current Medications:    calcium carbonate (TUMS) chewable tablet 500 mg, TID  butalbital-acetaminophen-caffeine (FIORICET, ESGIC) per tablet 1 tablet, Q4H PRN  aluminum & magnesium hydroxide-simethicone (MAALOX) 200-200-20 MG/5ML suspension 30 mL, Q6H PRN  sodium chloride flush 0.9 % injection 10 mL, PRN  ipratropium-albuterol (DUONEB) nebulizer solution 1 ampule, 4x daily  traMADol (ULTRAM) tablet 50 mg, Q12H PRN  sodium chloride flush 0.9 % injection 5-40 mL, PRN  0.9 % sodium chloride infusion, PRN  pantoprazole (PROTONIX) tablet 40 mg, QAM AC  amitriptyline (ELAVIL) tablet 75 mg, Nightly  amLODIPine (NORVASC) tablet 5 mg, Daily  apixaban (ELIQUIS) tablet 5 mg, BID  sodium chloride flush 0.9 % injection 5-40 mL, 2 times per day  sodium chloride flush 0.9 % injection 5-40 mL, PRN  0.9 % sodium chloride infusion, PRN  promethazine (PHENERGAN) tablet 12.5 mg, Q6H PRN    Or  ondansetron (ZOFRAN) injection 4 mg, Q6H PRN  polyethylene glycol (GLYCOLAX) packet 17 g, Daily PRN  acetaminophen (TYLENOL) tablet 650 mg, Q6H PRN  predniSONE (DELTASONE) tablet 40 mg, Daily  cholestyramine (QUESTRAN) packet 4 g, Daily  albuterol sulfate  (90 Base) MCG/ACT inhaler 2 puff, Q2H PRN            Physical exam:     Vitals:  BP (!) 145/89   Pulse 94   Temp 97.9 °F (36.6 °C) (Oral)   Resp 18   Ht 4' 11\" (1.499 m)   Wt 146 lb 9.6 oz (66.5 kg)   SpO2 95%   BMI 29.61 kg/m²   Constitutional:  OAA X3 NAD  Skin: no rash, turgor wnl  Heent:  eomi, mmm  Neck: no bruits or jvd noted  Cardiovascular:  S1, S2 without m/r/g  Respiratory: CTA B without w/r/r  Abdomen:  +bs, soft, nt, nd  Ext: no lower extremity edema      Labs:  CBC:   No results for input(s): WBC, HGB, PLT in the last 72 hours. BMP:    Recent Labs     04/11/21  0514 04/12/21  0752 04/13/21  0530    136 139   K 5.2* 4.6 4.7    104 105   CO2 19* 19* 23   BUN 40* 37* 37*   CREATININE 2.1* 2.0* 2.0*   GLUCOSE 113* 127* 81     Ca/Mg/Phos:   Recent Labs     04/11/21  0514 04/12/21  0752 04/13/21  0530   CALCIUM 8.7 8.5 9.2     Hepatic:   No results for input(s): AST, ALT, ALB, BILITOT, ALKPHOS in the last 72 hours. Troponin:   Recent Labs     04/11/21  1937 04/12/21  0128 04/12/21  0752   TROPONINI 0.01 <0.01 0.01     BNP: No results for input(s): BNP in the last 72 hours. Lipids: No results for input(s): CHOL, TRIG, HDL, LDLCALC, LABVLDL in the last 72 hours. ABGs: No results for input(s): PHART, PO2ART, DDY1JLH in the last 72 hours.   INR: No results for input(s): INR in the last 72 hours. UA:  Recent Labs     04/11/21  1725   COLORU YELLOW   CLARITYU Clear   GLUCOSEU Negative   BILIRUBINUR Negative   KETUA Negative   SPECGRAV 1.012   BLOODU Negative   PHUR 5.0   PROTEINU Negative   UROBILINOGEN 0.2   NITRU Negative   LEUKOCYTESUR Negative   LABMICR Not Indicated   Lamar Sardis NotGiven      Urine Microscopic:   Recent Labs     04/11/21  1725   HYALCAST 2   WBCUA 1   RBCUA 2   EPIU 1     Urine Culture: No results for input(s): LABURIN in the last 72 hours. Urine Chemistry:   Recent Labs     04/11/21  1725   LABCREA 50.2              IMAGING:  XR CHEST PORTABLE   Final Result   Right-sided opacity concerning for infiltrate and pneumonia. Findings may be   accentuated by rotation. Consider imaging follow-up. NM LUNG VENT/PERFUSION (VQ)   Final Result   Very low probability for pulmonary embolism. XR CHEST (2 VW)   Final Result   No convincing evidence for acute cardiopulmonary pathology. CT CHEST WO CONTRAST   Final Result   No acute abnormality identified in the chest.      The previously noted left upper lobe consolidation on the CT from 09/14/2020   has resolved with mild atelectasis and/or scarring remaining. Emphysematous changes are seen in the lungs. XR CHEST PORTABLE   Final Result   No acute process. Assessment/Plan :      1. MARIS oN CKD Stage 3B  MARIS likely 2/2 hemodynamic changes   Improving   No edema   Creat stable at 2.0     2. HTN. Controlled     3. H/o COPD. Exacerbation now . On steroids     4. Acid- base disorder. Has chronic metabolic acidosis   Give calcium carbonate TID     5.  Electrolytes  Mild hyperkalemia   Low k diet   Lokelma       Recommend to dose adjust all medications  based on renal functions  Maintain SBP> 90 mmHg   Daily weights   AVOID NSAIDs  Avoid Nephrotoxins  Monitor Intake/Output  Call if significant decrease in urine output                 Thank you for allowing us to participate in care of Gini Hudson         Electronically signed by: Chel Royal MD, 4/13/2021,       [facility] [address]  Office : [phone]  Fax :[phone]

## 2021-05-26 DIAGNOSIS — R06.02 SHORTNESS OF BREATH: ICD-10-CM

## 2021-10-08 PROBLEM — Z00.00 ENCOUNTER FOR PREVENTIVE HEALTH EXAMINATION: Noted: 2021-10-08

## 2021-10-14 ENCOUNTER — NON-APPOINTMENT (OUTPATIENT)
Age: 31
End: 2021-10-14

## 2021-10-15 RX ORDER — ALBUTEROL SULFATE 90 UG/1
108 (90 BASE) AEROSOL, METERED RESPIRATORY (INHALATION) EVERY 4 HOURS
Qty: 1 | Refills: 2 | Status: ACTIVE | COMMUNITY
Start: 2020-07-02 | End: 1900-01-01

## 2021-10-15 RX ORDER — AZITHROMYCIN 250 MG/1
250 TABLET, FILM COATED ORAL
Qty: 1 | Refills: 0 | Status: COMPLETED | COMMUNITY
Start: 2019-07-22 | End: 2021-10-20

## 2021-10-15 RX ORDER — BENZONATATE 100 MG/1
100 CAPSULE ORAL 3 TIMES DAILY
Qty: 90 | Refills: 3 | Status: COMPLETED | COMMUNITY
Start: 2019-07-22 | End: 2022-02-12

## 2021-11-19 RX ORDER — OXYCODONE 5 MG/1
5 TABLET ORAL TWICE DAILY
Qty: 10 | Refills: 0 | Status: DISCONTINUED | COMMUNITY
Start: 2019-02-12 | End: 2021-11-19

## 2021-12-27 ENCOUNTER — RX RENEWAL (OUTPATIENT)
Age: 31
End: 2021-12-27

## 2021-12-27 RX ORDER — PANTOPRAZOLE 40 MG/1
40 TABLET, DELAYED RELEASE ORAL
Qty: 90 | Refills: 3 | Status: ACTIVE | COMMUNITY
Start: 2018-06-27 | End: 1900-01-01

## 2022-01-31 NOTE — PATIENT PROFILE ADULT. - BLOOD TRANSFUSION, PREVIOUS, PROFILE
Rx Refill Note  Requested Prescriptions     Pending Prescriptions Disp Refills   • Eliquis 5 MG tablet tablet [Pharmacy Med Name: ELIQUIS TABS 5MG] 180 tablet 3     Sig: TAKE 1 TABLET EVERY 12 HOURS      Last office visit with prescribing clinician: 7/24/2020      Next office visit with prescribing clinician: 2/7/2022            Emma Lanier LPN  01/31/22, 12:28 EST   no

## 2022-03-05 ENCOUNTER — NON-APPOINTMENT (OUTPATIENT)
Age: 32
End: 2022-03-05

## 2022-03-05 RX ORDER — OFLOXACIN 3 MG/ML
0.3 SOLUTION/ DROPS OPHTHALMIC
Qty: 1 | Refills: 0 | Status: ACTIVE | COMMUNITY
Start: 2020-02-08 | End: 1900-01-01

## 2022-03-05 RX ORDER — ERYTHROMYCIN 5 MG/G
5 OINTMENT OPHTHALMIC 4 TIMES DAILY
Qty: 1 | Refills: 0 | Status: ACTIVE | COMMUNITY
Start: 2020-02-08 | End: 1900-01-01

## 2022-03-07 ENCOUNTER — RX RENEWAL (OUTPATIENT)
Age: 32
End: 2022-03-07

## 2022-05-09 RX ORDER — GABAPENTIN 400 MG/1
400 CAPSULE ORAL TWICE DAILY
Qty: 120 | Refills: 3 | Status: ACTIVE | COMMUNITY
Start: 2018-06-27 | End: 1900-01-01

## 2022-05-09 RX ORDER — TRAMADOL HYDROCHLORIDE 50 MG/1
50 TABLET, COATED ORAL
Qty: 60 | Refills: 3 | Status: ACTIVE | COMMUNITY
Start: 2021-11-19 | End: 1900-01-01

## 2022-06-15 DIAGNOSIS — G43.909 MIGRAINE, UNSPECIFIED, NOT INTRACTABLE, W/OUT STATUS MIGRAINOSUS: ICD-10-CM

## 2022-06-15 NOTE — HISTORY OF PRESENT ILLNESS
[Other Location: e.g. School (Enter Location, City,State)___] : at [unfilled], at the time of the visit. [Medical Office: ([facility])___] : at the medical office located in  [Verbal consent obtained from patient] : the patient, [unfilled] [FreeTextEntry8] : \par New migraine headache for the past 4 months, with associated paresthesia down left side of body. Occurs intermittently. No fevers or chills. Has associated visual disturbance as well with the migraine. Oral medications used to help but not anymore.

## 2022-06-15 NOTE — ASSESSMENT
[FreeTextEntry1] : \par Concerned about worsening migraine headaches, no longer responding to oral NSAIDs. And now with new associated paresthesia as new neurologic finding. Will obtain MRI to evaluate for mass lesion.

## 2022-10-05 ENCOUNTER — RX RENEWAL (OUTPATIENT)
Age: 32
End: 2022-10-05

## 2022-10-05 RX ORDER — ALBUTEROL SULFATE 90 UG/1
108 (90 BASE) INHALANT RESPIRATORY (INHALATION)
Qty: 8.5 | Refills: 0 | Status: ACTIVE | COMMUNITY
Start: 2022-10-05 | End: 1900-01-01

## 2022-12-20 ENCOUNTER — LABORATORY RESULT (OUTPATIENT)
Age: 32
End: 2022-12-20

## 2022-12-27 ENCOUNTER — APPOINTMENT (OUTPATIENT)
Dept: INTERNAL MEDICINE | Facility: CLINIC | Age: 32
End: 2022-12-27

## 2022-12-27 VITALS
HEART RATE: 84 BPM | WEIGHT: 228 LBS | TEMPERATURE: 98 F | HEIGHT: 72 IN | DIASTOLIC BLOOD PRESSURE: 70 MMHG | SYSTOLIC BLOOD PRESSURE: 109 MMHG | BODY MASS INDEX: 30.88 KG/M2 | OXYGEN SATURATION: 98 %

## 2022-12-27 DIAGNOSIS — Z13.39 ENCOUNTER FOR SCREENING EXAM FOR OTHER MENTAL HEALTH AND BEHAVIORAL DISORDERS: ICD-10-CM

## 2022-12-27 DIAGNOSIS — K21.9 GASTRO-ESOPHAGEAL REFLUX DISEASE W/OUT ESOPHAGITIS: ICD-10-CM

## 2022-12-27 DIAGNOSIS — Z13.31 ENCOUNTER FOR SCREENING FOR DEPRESSION: ICD-10-CM

## 2022-12-27 DIAGNOSIS — E66.9 OBESITY, UNSPECIFIED: ICD-10-CM

## 2022-12-27 LAB
ALBUMIN SERPL ELPH-MCNC: 4.5 G/DL
ALP BLD-CCNC: 80 U/L
ALT SERPL-CCNC: 49 U/L
ANA SER IF-ACNC: NEGATIVE
ANION GAP SERPL CALC-SCNC: 12 MMOL/L
AST SERPL-CCNC: 38 U/L
BASOPHILS # BLD AUTO: 0.06 K/UL
BASOPHILS NFR BLD AUTO: 0.9 %
BILIRUB SERPL-MCNC: 0.5 MG/DL
BUN SERPL-MCNC: 15 MG/DL
C TRACH RRNA SPEC QL NAA+PROBE: NOT DETECTED
CALCIUM SERPL-MCNC: 9.3 MG/DL
CHLORIDE SERPL-SCNC: 102 MMOL/L
CHOLEST SERPL-MCNC: 150 MG/DL
CO2 SERPL-SCNC: 26 MMOL/L
CREAT SERPL-MCNC: 1 MG/DL
CRP SERPL HS-MCNC: 3.07 MG/L
EGFR: 102 ML/MIN/1.73M2
EOSINOPHIL # BLD AUTO: 0.24 K/UL
EOSINOPHIL NFR BLD AUTO: 3.4 %
ESTIMATED AVERAGE GLUCOSE: 114 MG/DL
GLUCOSE SERPL-MCNC: 77 MG/DL
HAV IGM SER QL: NONREACTIVE
HBA1C MFR BLD HPLC: 5.6 %
HBV CORE IGM SER QL: NONREACTIVE
HBV SURFACE AG SER QL: NONREACTIVE
HCT VFR BLD CALC: 48 %
HCV AB SER QL: NONREACTIVE
HCV S/CO RATIO: 0.09 S/CO
HDLC SERPL-MCNC: 45 MG/DL
HGB BLD-MCNC: 15.5 G/DL
HIV1+2 AB SPEC QL IA.RAPID: NONREACTIVE
HSV 1+2 IGG SER IA-IMP: NEGATIVE
HSV 1+2 IGG SER IA-IMP: POSITIVE
HSV1 IGG SER QL: 17.7 INDEX
HSV1 IGM SER QL: NEGATIVE
HSV2 AB FLD-ACNC: NEGATIVE
HSV2 IGG SER QL: 0.22 INDEX
IMM GRANULOCYTES NFR BLD AUTO: 0.7 %
LDLC SERPL CALC-MCNC: 74 MG/DL
LYMPHOCYTES # BLD AUTO: 2.14 K/UL
LYMPHOCYTES NFR BLD AUTO: 30.5 %
MAN DIFF?: NORMAL
MCHC RBC-ENTMCNC: 26.9 PG
MCHC RBC-ENTMCNC: 32.3 GM/DL
MCV RBC AUTO: 83.2 FL
MONOCYTES # BLD AUTO: 0.72 K/UL
MONOCYTES NFR BLD AUTO: 10.3 %
N GONORRHOEA RRNA SPEC QL NAA+PROBE: NOT DETECTED
NEUTROPHILS # BLD AUTO: 3.81 K/UL
NEUTROPHILS NFR BLD AUTO: 54.2 %
NONHDLC SERPL-MCNC: 105 MG/DL
PLATELET # BLD AUTO: 245 K/UL
POTASSIUM SERPL-SCNC: 4.2 MMOL/L
PROLACTIN SERPL-MCNC: 11 NG/ML
PROT SERPL-MCNC: 7 G/DL
PSA SERPL-MCNC: 0.33 NG/ML
RBC # BLD: 5.77 M/UL
RBC # FLD: 14.8 %
SODIUM SERPL-SCNC: 140 MMOL/L
SOURCE AMPLIFICATION: NORMAL
T PALLIDUM AB SER QL IA: NEGATIVE
TESTOST FREE SERPL-MCNC: 13.8 PG/ML
TESTOST SERPL-MCNC: 193 NG/DL
TRIGL SERPL-MCNC: 152 MG/DL
TSH SERPL-ACNC: 0.76 UIU/ML
WBC # FLD AUTO: 7.02 K/UL

## 2022-12-27 PROCEDURE — 99215 OFFICE O/P EST HI 40 MIN: CPT | Mod: 25

## 2022-12-27 PROCEDURE — G0444 DEPRESSION SCREEN ANNUAL: CPT | Mod: NC,59

## 2022-12-27 PROCEDURE — G0447 BEHAVIOR COUNSEL OBESITY 15M: CPT | Mod: NC,59

## 2022-12-27 PROCEDURE — G0442 ANNUAL ALCOHOL SCREEN 15 MIN: CPT | Mod: NC,59

## 2022-12-27 PROCEDURE — 99395 PREV VISIT EST AGE 18-39: CPT

## 2022-12-27 NOTE — HISTORY OF PRESENT ILLNESS
[FreeTextEntry1] : Presents for preventive visit as well as acute concerns of esophageal reflux and obesity. [de-identified] : \par Preventive visit: pt is up to date with vaccines; he does not smoke cigarettes and does not misuse alcohol; he feels safe at home and has smoke/CO detectors in the house; he wears seatbelts in vehicles\par \par Medical Issue 1: Esophageal reflux: unstable and worsening with more chest discomfort now after eating spicy foods and drinking caffeine; he denies melena, BRBPR and coffee ground emesis\par \par Medical Issue 2: Obesity: unstable with increase of 20 lbs over the past year; he admits to poor diet and exercise routine; he denies shortness of breath and dizziness

## 2022-12-27 NOTE — ASSESSMENT
[FreeTextEntry1] : \par Preventive visit: pt is up to date with vaccines; he does not smoke cigarettes and does not misuse alcohol; encouraged use of smoke/CO detectors in the house and use of seatbelts in vehicles\par \par Medical Issue 1: Esophageal reflux: unstable and worsening with more chest discomfort now after eating spicy foods and drinking caffeine; prescribed PPi daily, 30 mins before breakfast and coordinated follow-up care with GI for EGD\par \par Medical Issue 2: Obesity counselling: 15 mins, assessed BMI at 30 and above now in obese range; advised weight loss, exercise routine and diet; pt agreed to start exercise program for target weight loss 20 lbs; assisted patient with resources including nutrition referral as needed and arranged for follow-up to monitor weight loss progress over next several months\par \par Annual alcohol misuse screen, 15 mins, done; negative\par \par Depression screen performed today, PHQ2=0, negative.\par

## 2022-12-27 NOTE — HEALTH RISK ASSESSMENT
[Good] : ~his/her~  mood as  good [Yes] : Yes [Monthly or less (1 pt)] : Monthly or less (1 point) [1 or 2 (0 pts)] : 1 or 2 (0 points) [Never (0 pts)] : Never (0 points) [No falls in past year] : Patient reported no falls in the past year [0] : 2) Feeling down, depressed, or hopeless: Not at all (0) [PHQ-2 Negative - No further assessment needed] : PHQ-2 Negative - No further assessment needed [None] : None [With Family] : lives with family [Employed] : employed [Single] : single [Feels Safe at Home] : Feels safe at home [Fully functional (bathing, dressing, toileting, transferring, walking, feeding)] : Fully functional (bathing, dressing, toileting, transferring, walking, feeding) [Fully functional (using the telephone, shopping, preparing meals, housekeeping, doing laundry, using] : Fully functional and needs no help or supervision to perform IADLs (using the telephone, shopping, preparing meals, housekeeping, doing laundry, using transportation, managing medications and managing finances) [Reports normal functional visual acuity (ie: able to read med bottle)] : Reports normal functional visual acuity [Smoke Detector] : smoke detector [Carbon Monoxide Detector] : carbon monoxide detector [Safety elements used in home] : safety elements used in home [Seat Belt] :  uses seat belt [Sunscreen] : uses sunscreen [Audit-CScore] : 1 [[id number]] : 0 [Change in mental status noted] : No change in mental status noted [Language] : denies difficulty with language [Behavior] : denies difficulty with behavior [Learning/Retaining New Information] : denies difficulty learning/retaining new information [Handling Complex Tasks] : denies difficulty handling complex tasks [Reasoning] : denies difficulty with reasoning [Spatial Ability and Orientation] : denies difficulty with spatial ability and orientation [Sexually Active] : not sexually active [High Risk Behavior] : no high risk behavior [Reports changes in hearing] : Reports no changes in hearing [Reports changes in vision] : Reports no changes in vision [Reports changes in dental health] : Reports no changes in dental health [Guns at Home] : no guns at home [Travel to Developing Areas] : does not  travel to developing areas [TB Exposure] : is not being exposed to tuberculosis [Caregiver Concerns] : does not have caregiver concerns

## 2022-12-28 LAB
CHOLEST SERPL-MCNC: 166 MG/DL
HDLC SERPL-MCNC: 52 MG/DL
LDLC SERPL CALC-MCNC: 86 MG/DL
NONHDLC SERPL-MCNC: 114 MG/DL
TRIGL SERPL-MCNC: 140 MG/DL

## 2022-12-30 DIAGNOSIS — R79.89 OTHER SPECIFIED ABNORMAL FINDINGS OF BLOOD CHEMISTRY: ICD-10-CM

## 2022-12-30 LAB
TESTOST FREE SERPL-MCNC: 18.5 PG/ML
TESTOST SERPL-MCNC: 235 NG/DL

## 2023-01-03 DIAGNOSIS — U07.1 COVID-19: ICD-10-CM

## 2023-01-03 RX ORDER — NIRMATRELVIR AND RITONAVIR 300-100 MG
20 X 150 MG & KIT ORAL
Qty: 1 | Refills: 0 | Status: ACTIVE | COMMUNITY
Start: 2023-01-03 | End: 1900-01-01

## 2023-02-05 ENCOUNTER — NON-APPOINTMENT (OUTPATIENT)
Age: 33
End: 2023-02-05

## 2023-02-08 NOTE — ED PROVIDER NOTE - TOBACCO USE
[FreeTextEntry1] : A/A/Ox3\par follows 4 step commands\par Crosses the midline well\par slightly tired eyes but not falling sleep and maintains his attention well\par normal language\par good memory of the current events\par No drift\par No dysmetria\par able to stand up with little help of his hands\par Hesitant and slow , slightly shuffling gait without the cane
Unknown if ever smoked

## 2023-02-09 DIAGNOSIS — H60.90 UNSPECIFIED OTITIS EXTERNA, UNSPECIFIED EAR: ICD-10-CM

## 2023-02-09 RX ORDER — OFLOXACIN OTIC 3 MG/ML
0.3 SOLUTION AURICULAR (OTIC)
Qty: 10 | Refills: 0 | Status: COMPLETED | COMMUNITY
Start: 2021-02-11 | End: 2023-02-09

## 2023-02-13 RX ORDER — NEOMYCIN SULFATE, POLYMYXIN B SULFATE AND HYDROCORTISONE 3.5; 10000; 1 MG/ML; [IU]/ML; MG/ML
3.5-10000-1 SOLUTION AURICULAR (OTIC)
Qty: 1 | Refills: 0 | Status: COMPLETED | COMMUNITY
Start: 2023-02-09 | End: 2023-02-18

## 2023-02-21 ENCOUNTER — APPOINTMENT (OUTPATIENT)
Dept: HUMAN REPRODUCTION | Facility: CLINIC | Age: 33
End: 2023-02-21

## 2023-02-28 DIAGNOSIS — Z00.00 ENCOUNTER FOR GENERAL ADULT MEDICAL EXAMINATION W/OUT ABNORMAL FINDINGS: ICD-10-CM

## 2023-03-15 ENCOUNTER — APPOINTMENT (OUTPATIENT)
Dept: OPHTHALMOLOGY | Facility: CLINIC | Age: 33
End: 2023-03-15

## 2023-03-16 ENCOUNTER — APPOINTMENT (OUTPATIENT)
Dept: UROLOGY | Facility: CLINIC | Age: 33
End: 2023-03-16

## 2023-03-22 NOTE — ED ADULT NURSE NOTE - ED STAT RN HANDOFF TIME
Additional History: Patient states that the rash comes back every spring. Sun makes it worse. Patient has tried Nizoral and did not help at all. 19:10

## 2023-05-02 NOTE — ED PROVIDER NOTE - NS ED MD EM SELECTION
Problem: Safety - Adult  Goal: Free from fall injury  Outcome: Progressing     Problem: Discharge Planning  Goal: Discharge to home or other facility with appropriate resources  Outcome: Progressing  Flowsheets (Taken 5/2/2023 0804)  Discharge to home or other facility with appropriate resources: Identify barriers to discharge with patient and caregiver [id number] Comprehensive

## 2023-06-07 NOTE — ED PROVIDER NOTE - NS ED MD DISPO DIVISION
NICK Spironolactone Pregnancy And Lactation Text: This medication can cause feminization of the male fetus and should be avoided during pregnancy. The active metabolite is also found in breast milk.

## 2023-08-14 NOTE — ED PROVIDER NOTE - CPE EDP EYES NORM
Addendum 8/14:  Discussed with urology.  Patient is always leaking from suprapubic catheter.  He needs to follow-up with a urologist as an outpatient but has unusual insurance.  He will need to contact insurance company to find a urologist.  He should continue oxybutynin.  -dc today    Hospitalist H&P    CHIEF COMPLAINT: Small bowel obstruction (CMD) [K56.609]  Complicated UTI (urinary tract infection) [N39.0]    HPI: Rodrigo Berry is a 67 year old male w/PMH (per pt and chart review) including paraplegia, ischemic bowel s/p partial colectomy with colostomy, suprapubic catheter, h/o DVT on apixaban and IVC filter, sacral decubitus ulcer complicated by osteomyelitis, who presents with leaking around suprapubic catheter. The patient was sent to the ED due to leaking around the suprapubic catheter at his nursing home.  A UA was obtained in the ED with large blood, WBC >100, RBC 6-10, nitrite positive, leukoesterase large, bacteria large, squamous 1-5.  He has no symptoms of UTI.  He denies any suprapubic pain, nausea, fevers, chills.  WBC was within normal limits.  He was given dose ceftriaxone and then changed to Zosyn after he was found to have a history of MDRO.  He was admitted for further care.     Review of Systems:  10 systems reviewed and negative except noted in HPI     Past Medical History:   Diagnosis Date   • AICD (automatic cardioverter/defibrillator) present    • Anemia    • DVT (deep venous thrombosis) (CMD)    • Essential (primary) hypertension    • Gastroesophageal reflux disease    • High cholesterol    • Hyperkalemia    • Paraplegia (CMD)    • Urinary tract infection        Past Surgical History:   Procedure Laterality Date   • Hemicolectomy w/ ostomy     • Suprapubic tube placement         Current Facility-Administered Medications   Medication   • sodium chloride 0.9 % flush bag 25 mL   • sodium chloride (PF) 0.9 % injection 2 mL   • lidocaine (LIDOCARE) 4 % patch 2 patch   • acetaminophen (TYLENOL)  tablet 1,000 mg   • albuterol (VENTOLIN) nebulizer 2.5 mg   • apixaBAN (ELIQUIS) tablet 2.5 mg   • baclofen (LIORESAL) tablet 10 mg   • cyproheptadine (PERIACTIN) tablet 4 mg   • docusate sodium-sennosides (SENOKOT S) 50-8.6 MG 1 tablet   • furosemide (LASIX) tablet 20 mg   • metoPROLOL tartrate (LOPRESSOR) tablet 25 mg   • pantoprazole (PROTONIX) EC tablet 40 mg   • oxybutynin (DITROPAN) tablet 5 mg        ALLERGIES:   Allergen Reactions   • Pollen Other (See Comments)       Social History:    Tobacco Use: Yes           Packs/Day:       Years:          Drug Use:    Not Current*     Alcohol Use: Not Current*   Review of patient's social economics indicates:  No social economics status on file       Family History:  family history is not on file.       OBJECTIVE:  Vitals with min/max:      Vital Last Value 24 Hour Range   Temperature 97.9 °F (36.6 °C) (08/14/23 0406) Temp  Min: 97 °F (36.1 °C)  Max: 97.9 °F (36.6 °C)   Pulse (!) 56 (08/14/23 0831) Pulse  Min: 56  Max: 74   Respiratory 16 (08/14/23 0320) Resp  Min: 16  Max: 16   Non-Invasive  Blood Pressure (!) 172/108 (08/14/23 0831) BP  Min: 123/74  Max: 172/108   Pulse Oximetry 100 % (08/14/23 0831) SpO2  Min: 99 %  Max: 100 %   Arterial   Blood Pressure   No data recorded         08/13 0700 - 08/14 0659  In: 10.2   Out: 225 [Urine:225]     Physical Exam:  Gen alert, nad  Eyes anicteric, EOMI  ENT MMM, OP clear  cor rrr, no g/r  lungs ctab, normal lung excursion  abd no some tenderness on the colostomy but no other abdominal tenderness, nd, soft, +bs  skin no rash on UE, no pallor  MSK decreased muscle bulk, no joint swelling  Neuro answers questions appropriately, no tremors  Psych pleasant, calm, good eye contact      Labs:  Recent Labs   Lab 08/14/23  0620 08/14/23  0227   WBC 9.7 9.1   HGB 12.6* 13.7    286     Recent Labs   Lab 08/14/23  0620 08/14/23  0110   SODIUM 144 140   POTASSIUM 3.6 5.1   CHLORIDE 107 104   CO2 30 31   BUN 12 12   CREATININE 0.73  0.93   GLUCOSE 85 91   CALCIUM 8.7 8.6     Recent Labs   Lab 23  0110   AST 34   GPT 28   ALKPT 124*   BILIRUBIN 0.4   ALBUMIN 3.3*       No results found     No results found  No results found  No results found for: \"CHOLESTEROL\" No results found for: \"HDL\" No results found for: \"CHOHDL\"   Triglycerides (mg/dL)   Date Value   2023 133    No results found for: \"CALCLDL\"   Microbiology Results     None        * Cannot find OR log *     Studies:  No results found.     Results for orders placed during the hospital encounter of 23    TRANSTHORACIC ECHO (TTE) COMPLETE W/ W/O IMAGING AGENT    Impression  *[facility]*  [address] [place] [id number]  Phone [phone]  Fax [phone]  Transthoracic Echocardiogram (TTE)    Patient: Rodrigo Berry   Study Date/Time:          2023 8:14AM  MRN:     [id number]        FIN#:                     [id number]  :     1956     Ht/Wt:                    170.2cm 62.1kg  Age:     67             BSA/BMI:                  1.71m^2 21.5kg/m^2  Gender:  M              Baseline BP:              114 / 86  Ordering Physician:   Damir Norris    Referring Physician:  Damir Norris Zewdu    Attending Physician:  Stephan Desir  Performing Physician: Capo Voss MD  Diagnostic Physician: Capo Voss MD  Sonographer:          Edward Horta RDCS    ------------------------------------------------------------------------------  INDICATIONS:   Bowel ischemia; ? vegetations, thrombus.    ------------------------------------------------------------------------------  STUDY CONCLUSIONS  SUMMARY:    1. Left ventricle: The cavity size is normal. Wall thickness is normal.  Systolic function is mildly reduced. The ejection fraction was measured by  3D assessment. Hypokinesis of the basalinferior wall. The tissue Doppler  parameters are abnormal. Doppler parameters are consistent with abnormal  left ventricular  relaxation (grade 1 diastolic dysfunction). The ejection  fraction is 45%. The average E/e' ratio is 9.  2. Aortic valve: Not well visualized. The annulus is mildly calcified. The  valve is probably trileaflet. The leaflets are mildly thickened and mildly  calcified.  3. Right ventricle: The cavity size is normal. Systolic function is mildly  reduced by visual assessment. Systolic pressure is mildly increased. The  estimated peak pressure is 38mm Hg.  4. Tricuspid valve: There is moderate regurgitation.  5. Pericardium, extracardiac: There is a left pleural effusion.  IMPRESSIONS:  There is no previous report available for comparison at this  time. Valves are poorly seen. Cannot exclude IE. Recommend HOLLY.    ------------------------------------------------------------------------------  STUDY DATA:  There is no prior study available for comparison at this time.  Procedure:  A transthoracic echocardiogram was performed. Image quality was  adequate.  M-mode, complete 2D, 3D, complete spectral Doppler, and color  Doppler.  Study status:  Routine.  Study completion:  There were no  complications.    FINDINGS    LEFT VENTRICLE:  3D imaging and post-processing assessment performed. The  cavity size is normal. Wall thickness is normal. There is no evidence of  hypertrophy. Systolic function is mildly reduced.    The ejection fraction was  measured by 3D assessment. The ejection fraction is 45%.  REGIONAL WALL MOTION  ABNORMALITIES:   Hypokinesis of the basalinferior wall. The tissue Doppler  parameters are abnormal. Doppler parameters are consistent with abnormal left  ventricular relaxation (grade 1 diastolic dysfunction). There is no evidence  of elevated ventricular filling pressure by Doppler parameters.    AORTIC VALVE:  Not well visualized. The annulus is mildly calcified. The valve  is probably trileaflet. The leaflets are mildly thickened and mildly  calcified. Cusp separation is normal. Mobility is not restricted.  Velocity is  within the normal range. There is no stenosis. There is no significant  regurgitation. The peak systolic gradient is 3mm Hg. The ratio of LVOT to  aortic valve peak velocity is 0.82. The valve area is 2.3cm^2. The valve area  index is 1.37cm^2/m^2.    MITRAL VALVE:  Not well visualized. The annulus is mildly calcified. The  leaflets are mildly thickened. Leaflet separation is normal. Mobility is not  restricted. No evidence for prolapse. Inflow velocity is within the normal  range. There is no evidence for stenosis. There is mild regurgitation. The  mean diastolic gradient is 2mm Hg. The peak diastolic gradient is 3mm Hg. The  valve area by pressure half-time is 4.1cm^2. The valve area index by pressure  half-time is 2.38cm^2/m^2. The valve area (LVOT continuity) is 2.3cm^2. The  valve area index (LVOT continuity) is 1.33cm^2/m^2.    ATRIAL SEPTUM:   Color doppler shows no obvious shunt.    LEFT ATRIUM:  The atrium is normal in size.    RIGHT VENTRICLE:  The cavity size is normal. The moderator band is prominent.  Systolic function is mildly reduced by visual assessment. The TAPSE is  reduced, suggestive of RV systolic dysfunction.  Systolic pressure is mildly  increased. The estimated peak pressure is 38mm Hg.       The RV pressure  during systole is 38mm Hg.    VENTRICULAR SEPTUM:   Thickness is normal. Septal motion is mildly  dyssynergic. There is no diastolic flattening and no systolic flattening.    PULMONIC VALVE:   Not well visualized. Velocity is within the normal range.  There is no significant regurgitation. The peak systolic gradient is 1mm Hg.    TRICUSPID VALVE:  Leaflet separation is normal. There is moderate  regurgitation.    RIGHT ATRIUM:  The atrium is normal in size.       The estimated central  venous pressure is 3mm Hg.    PERICARDIUM:   There is no pericardial effusion.   There is a left pleural  effusion.    SYSTEMIC VEINS:  Inferior vena cava: The IVC is normal-sized.   Respirophasic diameter changes  are in the normal range (>= 50%).    Normal sinus rhythm.    ------------------------------------------------------------------------------  Measurements    Left ventricle            Value          Ref        Left atrium continued      Value          Ref  EMI, LAX chord        (L) 4.1   cm       4.2 - 5.8  Area ES, A2C               9     cm^2     ---------  ESD, LAX chord        (N) 3.2   cm       2.5 - 4.0  Area/bsa ES, A2C           5.15  cm^2/m^2 ---------  EMI/bsa, LAX chord    (N) 2.4   cm/m^2   2.2 - 3.0  Vol, ES, 1-p A4C       (N) 23    ml       18 - 58  ESD/bsa, LAX chord    (N) 1.9   cm/m^2   1.3 - 2.1  Vol/bsa, ES, 1-p A4C   (N) 14    ml/m^2   12 - 37  PW, ED, LAX           (N) 1.0   cm       0.6 - 1.0  Vol, ES, 1-p A2C       (L) 13    ml       18 - 58  EMI major ax, A4C         5.6   cm       ---------  Vol/bsa, ES, 1-p A2C   (L) 8     ml/m^2   11 - 43  ESD major ax, A4C         5.2   cm       ---------  FS major axis, A4C        7     %        ---------  Aortic valve               Value          Ref  EMI/bsa major ax, A4C     3.3   cm/m^2   ---------  Leaflet sep, MM            1.7   cm       ---------  ESD/bsa major ax, A4C     3.0   cm/m^2   ---------  Peak v, S                  0.9   m/sec    ---------  PHILLY, A4C                  24.5  cm^2     ---------  Peak grad, S               3     mm Hg    ---------  ELLA, A4C                  17.6  cm^2     ---------  LVOT/AV, Vpeak ratio       0.82           ---------  FAC, A4C                  28    %        ---------  LILIANA, Vmax                  2.3   cm^2     ---------  IVS, ED               (N) 1.0   cm       0.6 - 1.0  LILIANA/bsa, Vmax              1.37  cm^2/m^2 ---------  PW, ED                (N) 1.0   cm       0.6 - 1.0  IVS/PW, ED                1.03           ---------  Mitral valve               Value          Ref  EDV                   (N) 67    ml       62 - 150   Mean v, D                  0.64  m/sec     ---------  ESV                   (N) 32    ml       21 - 61    Peak E                     0.81  m/sec    ---------  EF                    (L) 45    %        52 - 72    Peak A                     0.99  m/sec    ---------  SV                        32    ml       ---------  VTI leaflet coapt          20.8  cm       ---------  EDV/bsa               (N) 39    ml/m^2   34 - 74    MiV/LVOT VTI               1.3            ---------  ESV/bsa               (N) 19    ml/m^2   11 - 31    Decel time                 164   ms       ---------  SV/bsa                    19    ml/m^2   ---------  PHT                        54    ms       ---------  ESV, 1-p A4C          (N) 50    ml       22 - 78    Mean grad, D               2     mm Hg    ---------  SV, 1-p A4C               39    ml       ---------  Peak grad, D               3     mm Hg    ---------  ESV/bsa, 1-p A4C      (N) 29    ml/m^2   12 - 40    Peak E/A ratio             0.8            ---------  SV/bsa, 1-p A4C           22    ml/m^2   ---------  A-VTI                      20.8  cm       ---------  EDV, 2-p              (N) 70    ml       62 - 150   MVA, PHT                   4.1   cm^2     ---------  ESV, 2-p              (N) 45    ml       21 - 61    MVA/bsa, PHT               2.38  cm^2/m^2 ---------  SV, 2-p                   24    ml       ---------  MVA, LVOT cont             2.3   cm^2     ---------  EDV/bsa, 2-p          (N) 41    ml/m^2   34 - 74    MVA/bsa, LVOT cont         1.33  cm^2/m^2 ---------  ESV/bsa, 2-p          (N) 27    ml/m^2   11 - 31  SV/bsa, 2-p               14.2  ml/m^2   ---------  Pulmonic valve             Value          Ref  E', lat latesha, TDI      (L) 9.5   cm/sec   >=10.0     Peak v, S                  0.6   m/sec    ---------  E/e', lat latesha, TDI    (N) 8              <=13       Peak grad, S               1     mm Hg    ---------  E', med latesha, TDI      (N) 8.3   cm/sec   >=7.0  E/e', med latesha, TDI        10             ---------  Tricuspid  valve            Value          Ref  E', avg, TDI              8.89  cm/sec   ---------  TR peak v              (H) 3     m/sec    <=2.8  E/e', avg, TDI        (N) 9              <=14       Peak RV-RA grad, S         35    mm Hg    ---------    LVOT                      Value          Ref        Aortic root                Value          Ref  Diam, S                   1.9   cm       ---------  Root diam, ED          (N) 3.5   cm       2.5 - 3.9  Area                      2.8   cm^2     ---------  S-T junct diam, ED     (N) 2.3   cm       2.3 - 3.5  Peak stan, S               0.75  m/sec    ---------  S-T junct diam/bsa, ED (N) 1.3   cm/m^2   1.1 - 1.9  Peak grad, S              2     mm Hg    ---------  Ascending aorta            Value          Ref  Right ventricle           Value          Ref        AAo AP diam, ED        (N) 3.4   cm       2.2 - 3.8  TAPSE, MM             (L) 1.5   cm       >=1.7      AAo AP diam/bsa, ED    (H) 2.0   cm/m^2   1.1 - 1.9  Pressure, S               38    mm Hg    ---------  Pulmonary artery           Value          Ref  Left atrium               Value          Ref        Pressure, S                37    mm Hg    ---------  AP dim, ES            (L) 2.4   cm       3.0 - 4.0  AP dim index, ES      (L) 1.4   cm/m^2   1.5 - 2.3  Systemic veins             Value          Ref  Area ES, A4C          (N) 12    cm^2     <=20       Estimated CVP              3     mm Hg    ---------  Area/bsa ES, A4C          7     cm^2/m^2 ---------  Legend:  (L)  and  (H)  bushra values outside specified reference range.    (N)  marks values inside specified reference range.    Prepared and electronically signed by  Bushra Voss MD  02/07/2023 09:36    No results found for this or any previous visit.    No results found for this or any previous visit.    No results found for this or any previous visit.    No results found for this or any previous visit.    ?      Current Facility-Administered Medications    Medication Dose Route Frequency Provider Last Rate Last Admin   • sodium chloride (PF) 0.9 % injection 2 mL  2 mL Intracatheter 2 times per day Divina Teresa MBBS   2 mL at 08/14/23 0836   • lidocaine (LIDOCARE) 4 % patch 2 patch  2 patch Transdermal Once Ludmila Florez MD   2 patch at 08/14/23 0337   • apixaBAN (ELIQUIS) tablet 2.5 mg  2.5 mg Oral BID Divina Teresa MBBS   2.5 mg at 08/14/23 0835   • baclofen (LIORESAL) tablet 10 mg  10 mg Oral TID Divina Teresa MBBS   10 mg at 08/14/23 0835   • cyproheptadine (PERIACTIN) tablet 4 mg  4 mg Oral TID Divina Teresa MBBS       • furosemide (LASIX) tablet 20 mg  20 mg Oral Daily Divina Teresa MBBS   20 mg at 08/14/23 0834   • metoPROLOL tartrate (LOPRESSOR) tablet 25 mg  25 mg Oral 2 times per day Divina Teresa MBBS   25 mg at 08/14/23 0834   • pantoprazole (PROTONIX) EC tablet 40 mg  40 mg Oral QAM AC Divina Teresa MBBS   40 mg at 08/14/23 0602   • oxybutynin (DITROPAN) tablet 5 mg  5 mg Oral TID Divina Teresa MBBS          Current Facility-Administered Medications   Medication Dose Route Frequency Provider Last Rate Last Admin   • sodium chloride 0.9 % flush bag 25 mL  25 mL Intravenous PRN Divina Teresa MBBS       • acetaminophen (TYLENOL) tablet 1,000 mg  1,000 mg Oral Q6H PRN Divina Teresa MBBS       • albuterol (VENTOLIN) nebulizer 2.5 mg  2.5 mg Nebulization Q4H Resp PRN Divina Teresa MBBS       • docusate sodium-sennosides (SENOKOT S) 50-8.6 MG 1 tablet  1 tablet Oral BID PRN Divina Teresa MBBS   1 tablet at 08/14/23 0834        ASSESSMENT/PLAN:?  Rodrigo Berry is a 67 year old male w/PMH (per pt and chart review) including paraplegia, ischemic bowel s/p partial colectomy with colostomy, suprapubic catheter, h/o DVT on apixaban and IVC filter, sacral decubitus ulcer complicated by osteomyelitis, who presents with leaking around suprapubic catheter.     Leakage around suprapubic catheter  -He was recently seen in the ED for the same thing  with a catheter exchange.  Urine is following freely per nursing  -Urology consult pending    Abnormal UA  -His UA is markedly abnormal ( large blood, WBC >100, RBC 6-10, nitrite positive, leukoesterase large, bacteria large, squamous 1-5.)  However, he always has an abnormal UA.  He has no signs or symptoms of infection so we will stop antibiotics.  -DC empiric Zosyn (8/13-8/14)    H/o DVT  -He still has an IVC filter in place to the best my knowledge.  He does not seem like he needs it any further since he is on anticoagulation so should probably be removed but will defer to his PCP.  I will include it on my discharge instructions for consideration  -ct apixaban     Paraplegia 2/2 spinal cord injury  -Return to nursing home pending urology eval  -PT and OT were consulted overnight but patient appears to be at baseline.  OT already saw him.  DC PT consult    Sacral decubitus ulcer  , POA  -Wound care was consulted overnight.  DC consult.  Resume the wound care he was receiving at his nursing home.    GERD  -Continue home PPI       FEN: Regular Diet      Prophylaxis:   Current Active Medications for DVT Prophylaxis (From admission, onward)         Stop     apixaBAN (ELIQUIS) tablet 2.5 mg  2.5 mg,   Oral,   2 TIMES DAILY        Note to Pharmacy: OP SIG:Take 1 tablet by mouth in the morning and 1 tablet in the evening.      --                Dispo: Return to nursing home pending urology eval    Full Resuscitation      PCP: Miguel Angel Wolfe MD - will route H&P        normal...

## 2024-01-17 NOTE — ED PROVIDER NOTE - EKG #1 DATE/TIME
HCC coding opportunities       Chart reviewed, no opportunity found: CHART REVIEWED, NO OPPORTUNITY FOUND        Patients Insurance        Commercial Insurance: [employer]        11-Jan-2018 00:48

## 2024-06-04 ENCOUNTER — NON-APPOINTMENT (OUTPATIENT)
Age: 34
End: 2024-06-04
